# Patient Record
Sex: MALE | Race: WHITE | NOT HISPANIC OR LATINO | Employment: OTHER | URBAN - METROPOLITAN AREA
[De-identification: names, ages, dates, MRNs, and addresses within clinical notes are randomized per-mention and may not be internally consistent; named-entity substitution may affect disease eponyms.]

---

## 2017-08-25 ENCOUNTER — HOSPITAL ENCOUNTER (OUTPATIENT)
Dept: OTHER | Facility: HOSPITAL | Age: 67
Setting detail: SPECIMEN
Discharge: HOME | End: 2017-08-25
Attending: FAMILY MEDICINE | Admitting: FAMILY MEDICINE

## 2017-08-25 LAB
ANION GAP SERPL CALC-SCNC: 12.1 MMOL/L (ref 10–20)
BNP SERPL-MCNC: 72 PG/ML
BUN SERPL-MCNC: 13 MG/DL (ref 8–20)
BUN/CREAT SERPL: 13 (ref 6.2–20.3)
CALCIUM SERPL-MCNC: 8.6 MG/DL (ref 8.9–10.3)
CHLORIDE SERPL-SCNC: 100 MMOL/L (ref 101–111)
CONV CO2: 34 MMOL/L (ref 22–32)
CREAT UR-MCNC: 1 MG/DL (ref 0.7–1.2)
GLUCOSE SERPL-MCNC: 123 MG/DL (ref 65–99)
POTASSIUM SERPL-SCNC: 4.1 MMOL/L (ref 3.6–5.1)
SODIUM SERPL-SCNC: 142 MMOL/L (ref 136–144)

## 2017-09-11 ENCOUNTER — HOSPITAL ENCOUNTER (OUTPATIENT)
Dept: OTHER | Facility: HOSPITAL | Age: 67
Setting detail: SPECIMEN
Discharge: HOME | End: 2017-09-11
Attending: FAMILY MEDICINE | Admitting: FAMILY MEDICINE

## 2017-09-11 LAB
ALBUMIN SERPL-MCNC: 4 G/DL (ref 3.5–4.8)
ALBUMIN/GLOB SERPL: 1.5 {RATIO} (ref 1–1.7)
ALP SERPL-CCNC: 93 IU/L (ref 32–91)
ALT SERPL-CCNC: 7 IU/L (ref 17–63)
ANION GAP SERPL CALC-SCNC: 9.8 MMOL/L (ref 10–20)
AST SERPL-CCNC: 20 IU/L (ref 15–41)
BILIRUB SERPL-MCNC: 1.1 MG/DL (ref 0.3–1.2)
BUN SERPL-MCNC: 16 MG/DL (ref 8–20)
BUN/CREAT SERPL: 14.5 (ref 6.2–20.3)
CALCIUM SERPL-MCNC: 8.5 MG/DL (ref 8.9–10.3)
CHLORIDE SERPL-SCNC: 100 MMOL/L (ref 101–111)
CONV CO2: 33 MMOL/L (ref 22–32)
CONV TOTAL PROTEIN: 6.7 G/DL (ref 6.1–7.9)
CREAT UR-MCNC: 1.1 MG/DL (ref 0.7–1.2)
GLOBULIN UR ELPH-MCNC: 2.7 G/DL (ref 2.5–3.8)
GLUCOSE SERPL-MCNC: 185 MG/DL (ref 65–99)
POTASSIUM SERPL-SCNC: 3.8 MMOL/L (ref 3.6–5.1)
SODIUM SERPL-SCNC: 139 MMOL/L (ref 136–144)

## 2024-10-04 ENCOUNTER — HOSPITAL ENCOUNTER (INPATIENT)
Facility: HOSPITAL | Age: 74
LOS: 4 days | Discharge: NURSING, REHAB OR LONG-TERM CARE | End: 2024-10-08
Attending: STUDENT IN AN ORGANIZED HEALTH CARE EDUCATION/TRAINING PROGRAM | Admitting: STUDENT IN AN ORGANIZED HEALTH CARE EDUCATION/TRAINING PROGRAM
Payer: OTHER GOVERNMENT

## 2024-10-04 DIAGNOSIS — S72.002A CLOSED FRACTURE OF LEFT HIP, INITIAL ENCOUNTER: Primary | ICD-10-CM

## 2024-10-04 PROBLEM — I73.9 PVD (PERIPHERAL VASCULAR DISEASE): Status: ACTIVE | Noted: 2024-10-04

## 2024-10-04 PROBLEM — G20.A1 PARKINSON DISEASE: Status: ACTIVE | Noted: 2024-10-04

## 2024-10-04 PROBLEM — I25.10 CORONARY ATHEROSCLEROSIS OF NATIVE CORONARY ARTERY: Status: ACTIVE | Noted: 2024-10-04

## 2024-10-04 PROBLEM — E11.9 T2DM (TYPE 2 DIABETES MELLITUS): Status: ACTIVE | Noted: 2024-10-04

## 2024-10-04 PROBLEM — S72.009A HIP FRACTURE: Status: ACTIVE | Noted: 2024-10-04

## 2024-10-04 LAB — GLUCOSE BLDC GLUCOMTR-MCNC: 154 MG/DL (ref 70–130)

## 2024-10-04 PROCEDURE — 82948 REAGENT STRIP/BLOOD GLUCOSE: CPT

## 2024-10-04 RX ORDER — POLYETHYLENE GLYCOL 3350 17 G/17G
17 POWDER, FOR SOLUTION ORAL DAILY PRN
Status: DISCONTINUED | OUTPATIENT
Start: 2024-10-04 | End: 2024-10-08 | Stop reason: HOSPADM

## 2024-10-04 RX ORDER — SODIUM CHLORIDE 0.9 % (FLUSH) 0.9 %
10 SYRINGE (ML) INJECTION AS NEEDED
Status: DISCONTINUED | OUTPATIENT
Start: 2024-10-04 | End: 2024-10-08 | Stop reason: HOSPADM

## 2024-10-04 RX ORDER — AMOXICILLIN 250 MG
2 CAPSULE ORAL 2 TIMES DAILY PRN
Status: DISCONTINUED | OUTPATIENT
Start: 2024-10-04 | End: 2024-10-08 | Stop reason: HOSPADM

## 2024-10-04 RX ORDER — DEXTROSE MONOHYDRATE 25 G/50ML
25 INJECTION, SOLUTION INTRAVENOUS
Status: DISCONTINUED | OUTPATIENT
Start: 2024-10-04 | End: 2024-10-08 | Stop reason: HOSPADM

## 2024-10-04 RX ORDER — BISACODYL 10 MG
10 SUPPOSITORY, RECTAL RECTAL DAILY PRN
Status: DISCONTINUED | OUTPATIENT
Start: 2024-10-04 | End: 2024-10-08 | Stop reason: HOSPADM

## 2024-10-04 RX ORDER — INSULIN LISPRO 100 [IU]/ML
2-7 INJECTION, SOLUTION INTRAVENOUS; SUBCUTANEOUS
Status: DISCONTINUED | OUTPATIENT
Start: 2024-10-05 | End: 2024-10-08 | Stop reason: HOSPADM

## 2024-10-04 RX ORDER — HYDROCODONE BITARTRATE AND ACETAMINOPHEN 5; 325 MG/1; MG/1
1 TABLET ORAL EVERY 6 HOURS PRN
Status: DISCONTINUED | OUTPATIENT
Start: 2024-10-04 | End: 2024-10-05

## 2024-10-04 RX ORDER — ACETAMINOPHEN 500 MG
1000 TABLET ORAL 3 TIMES DAILY PRN
COMMUNITY

## 2024-10-04 RX ORDER — IBUPROFEN 600 MG/1
1 TABLET ORAL
Status: DISCONTINUED | OUTPATIENT
Start: 2024-10-04 | End: 2024-10-08 | Stop reason: HOSPADM

## 2024-10-04 RX ORDER — BISACODYL 5 MG/1
5 TABLET, DELAYED RELEASE ORAL DAILY PRN
Status: DISCONTINUED | OUTPATIENT
Start: 2024-10-04 | End: 2024-10-08 | Stop reason: HOSPADM

## 2024-10-04 RX ORDER — NICOTINE POLACRILEX 4 MG
15 LOZENGE BUCCAL
Status: DISCONTINUED | OUTPATIENT
Start: 2024-10-04 | End: 2024-10-08 | Stop reason: HOSPADM

## 2024-10-04 RX ORDER — SODIUM CHLORIDE 9 MG/ML
40 INJECTION, SOLUTION INTRAVENOUS AS NEEDED
Status: DISCONTINUED | OUTPATIENT
Start: 2024-10-04 | End: 2024-10-08 | Stop reason: HOSPADM

## 2024-10-04 RX ORDER — SODIUM CHLORIDE 0.9 % (FLUSH) 0.9 %
10 SYRINGE (ML) INJECTION EVERY 12 HOURS SCHEDULED
Status: DISCONTINUED | OUTPATIENT
Start: 2024-10-04 | End: 2024-10-08 | Stop reason: HOSPADM

## 2024-10-04 RX ADMIN — Medication 10 ML: at 22:29

## 2024-10-04 NOTE — LETTER
EMS Transport Request  For use at [facility], [place], [place], [place], and [place] only   Patient Name: Deonte Harrell : 1950   Weight:95.7 kg (211 lb) Pick-up Location: P785-1 BLS/ALS: BLS/ALS: BLS   Insurance: MEDICARE Auth End Date:    Pre-Cert #: D/C Summary complete:    Destination: Other [facility] in [place], [place].    Contact Precautions: None   Equipment (O2, Fluids, etc.): None   Arrive By Date/Time: 24 @ 1100 Stretcher/WC: Stretcher   CM Requesting: Jammie Scott RN Ext: [phone]   Notes/Medical Necessity: confusion, Parkinson's, s/p hip fracture repair     ______________________________________________________________________    *Only 2 patient bags OR 1 carry-on size bag are permitted.  Wheelchairs and walkers CANNOT transported with the patient. Acknowledge: Yes

## 2024-10-05 ENCOUNTER — APPOINTMENT (OUTPATIENT)
Dept: GENERAL RADIOLOGY | Facility: HOSPITAL | Age: 74
End: 2024-10-05
Payer: OTHER GOVERNMENT

## 2024-10-05 LAB
ALBUMIN SERPL-MCNC: 3.6 G/DL (ref 3.5–5.2)
ALBUMIN/GLOB SERPL: 1.4 G/DL
ALP SERPL-CCNC: 93 U/L (ref 39–117)
ALT SERPL W P-5'-P-CCNC: 11 U/L (ref 1–41)
ANION GAP SERPL CALCULATED.3IONS-SCNC: 10.7 MMOL/L (ref 5–15)
AST SERPL-CCNC: 12 U/L (ref 1–40)
BILIRUB SERPL-MCNC: 0.8 MG/DL (ref 0–1.2)
BUN SERPL-MCNC: 11 MG/DL (ref 8–23)
BUN/CREAT SERPL: 17.5 (ref 7–25)
CALCIUM SPEC-SCNC: 8.5 MG/DL (ref 8.6–10.5)
CHLORIDE SERPL-SCNC: 101 MMOL/L (ref 98–107)
CO2 SERPL-SCNC: 27.3 MMOL/L (ref 22–29)
CREAT SERPL-MCNC: 0.63 MG/DL (ref 0.76–1.27)
DEPRECATED RDW RBC AUTO: 43.9 FL (ref 37–54)
EGFRCR SERPLBLD CKD-EPI 2021: 101.1 ML/MIN/1.73
ERYTHROCYTE [DISTWIDTH] IN BLOOD BY AUTOMATED COUNT: 12 % (ref 12.3–15.4)
GLOBULIN UR ELPH-MCNC: 2.5 GM/DL
GLUCOSE BLDC GLUCOMTR-MCNC: 140 MG/DL (ref 70–130)
GLUCOSE BLDC GLUCOMTR-MCNC: 150 MG/DL (ref 70–130)
GLUCOSE BLDC GLUCOMTR-MCNC: 161 MG/DL (ref 70–130)
GLUCOSE BLDC GLUCOMTR-MCNC: 184 MG/DL (ref 70–130)
GLUCOSE SERPL-MCNC: 160 MG/DL (ref 65–99)
HBA1C MFR BLD: 6.7 % (ref 4.8–5.6)
HCT VFR BLD AUTO: 35.6 % (ref 37.5–51)
HGB BLD-MCNC: 11.9 G/DL (ref 13–17.7)
MAGNESIUM SERPL-MCNC: 1.7 MG/DL (ref 1.6–2.4)
MCH RBC QN AUTO: 33.1 PG (ref 26.6–33)
MCHC RBC AUTO-ENTMCNC: 33.4 G/DL (ref 31.5–35.7)
MCV RBC AUTO: 99.2 FL (ref 79–97)
PHOSPHATE SERPL-MCNC: 2.9 MG/DL (ref 2.5–4.5)
PLATELET # BLD AUTO: 178 10*3/MM3 (ref 140–450)
PMV BLD AUTO: 10.7 FL (ref 6–12)
POTASSIUM SERPL-SCNC: 3.8 MMOL/L (ref 3.5–5.2)
PROT SERPL-MCNC: 6.1 G/DL (ref 6–8.5)
RBC # BLD AUTO: 3.59 10*6/MM3 (ref 4.14–5.8)
SODIUM SERPL-SCNC: 139 MMOL/L (ref 136–145)
WBC NRBC COR # BLD AUTO: 8.34 10*3/MM3 (ref 3.4–10.8)

## 2024-10-05 PROCEDURE — 85027 COMPLETE CBC AUTOMATED: CPT | Performed by: STUDENT IN AN ORGANIZED HEALTH CARE EDUCATION/TRAINING PROGRAM

## 2024-10-05 PROCEDURE — 94799 UNLISTED PULMONARY SVC/PX: CPT

## 2024-10-05 PROCEDURE — 73552 X-RAY EXAM OF FEMUR 2/>: CPT

## 2024-10-05 PROCEDURE — 82948 REAGENT STRIP/BLOOD GLUCOSE: CPT

## 2024-10-05 PROCEDURE — 94640 AIRWAY INHALATION TREATMENT: CPT

## 2024-10-05 PROCEDURE — 63710000001 INSULIN LISPRO (HUMAN) PER 5 UNITS: Performed by: STUDENT IN AN ORGANIZED HEALTH CARE EDUCATION/TRAINING PROGRAM

## 2024-10-05 PROCEDURE — 80053 COMPREHEN METABOLIC PANEL: CPT | Performed by: STUDENT IN AN ORGANIZED HEALTH CARE EDUCATION/TRAINING PROGRAM

## 2024-10-05 PROCEDURE — 83735 ASSAY OF MAGNESIUM: CPT | Performed by: STUDENT IN AN ORGANIZED HEALTH CARE EDUCATION/TRAINING PROGRAM

## 2024-10-05 PROCEDURE — 72170 X-RAY EXAM OF PELVIS: CPT

## 2024-10-05 PROCEDURE — 83036 HEMOGLOBIN GLYCOSYLATED A1C: CPT | Performed by: STUDENT IN AN ORGANIZED HEALTH CARE EDUCATION/TRAINING PROGRAM

## 2024-10-05 PROCEDURE — 84100 ASSAY OF PHOSPHORUS: CPT | Performed by: STUDENT IN AN ORGANIZED HEALTH CARE EDUCATION/TRAINING PROGRAM

## 2024-10-05 PROCEDURE — 93005 ELECTROCARDIOGRAM TRACING: CPT | Performed by: INTERNAL MEDICINE

## 2024-10-05 RX ORDER — ASPIRIN 81 MG/1
81 TABLET ORAL DAILY
COMMUNITY

## 2024-10-05 RX ORDER — PROMETHAZINE HYDROCHLORIDE 25 MG/1
12.5 TABLET ORAL EVERY 6 HOURS PRN
COMMUNITY
Start: 2014-10-22

## 2024-10-05 RX ORDER — IPRATROPIUM BROMIDE AND ALBUTEROL SULFATE 2.5; .5 MG/3ML; MG/3ML
3 SOLUTION RESPIRATORY (INHALATION)
COMMUNITY
Start: 2024-08-19

## 2024-10-05 RX ORDER — FUROSEMIDE 40 MG
1 TABLET ORAL DAILY
COMMUNITY

## 2024-10-05 RX ORDER — BISACODYL 5 MG/1
5 TABLET, DELAYED RELEASE ORAL DAILY PRN
COMMUNITY

## 2024-10-05 RX ORDER — METOPROLOL TARTRATE 50 MG
50 TABLET ORAL 2 TIMES DAILY
Status: DISCONTINUED | OUTPATIENT
Start: 2024-10-05 | End: 2024-10-08 | Stop reason: HOSPADM

## 2024-10-05 RX ORDER — CHOLECALCIFEROL (VITAMIN D3) 25 MCG
1000 TABLET ORAL DAILY
Status: DISCONTINUED | OUTPATIENT
Start: 2024-10-05 | End: 2024-10-08 | Stop reason: HOSPADM

## 2024-10-05 RX ORDER — POLYETHYLENE GLYCOL 3350 17 G/17G
17 POWDER, FOR SOLUTION ORAL 2 TIMES DAILY PRN
COMMUNITY
End: 2024-10-08 | Stop reason: HOSPADM

## 2024-10-05 RX ORDER — MORPHINE SULFATE 4 MG/ML
2 INJECTION, SOLUTION INTRAMUSCULAR; INTRAVENOUS
Status: DISCONTINUED | OUTPATIENT
Start: 2024-10-05 | End: 2024-10-08 | Stop reason: HOSPADM

## 2024-10-05 RX ORDER — PANTOPRAZOLE SODIUM 40 MG/1
40 TABLET, DELAYED RELEASE ORAL
Status: DISCONTINUED | OUTPATIENT
Start: 2024-10-06 | End: 2024-10-07 | Stop reason: ALTCHOICE

## 2024-10-05 RX ORDER — POLYETHYLENE GLYCOL 3350 17 G/17G
17 POWDER, FOR SOLUTION ORAL 2 TIMES DAILY
Status: DISCONTINUED | OUTPATIENT
Start: 2024-10-05 | End: 2024-10-08 | Stop reason: HOSPADM

## 2024-10-05 RX ORDER — PROMETHAZINE HYDROCHLORIDE 12.5 MG/1
12.5 TABLET ORAL EVERY 6 HOURS PRN
Status: DISCONTINUED | OUTPATIENT
Start: 2024-10-05 | End: 2024-10-08 | Stop reason: HOSPADM

## 2024-10-05 RX ORDER — LISINOPRIL 5 MG/1
5 TABLET ORAL DAILY
Status: DISCONTINUED | OUTPATIENT
Start: 2024-10-05 | End: 2024-10-08 | Stop reason: HOSPADM

## 2024-10-05 RX ORDER — POTASSIUM CHLORIDE 750 MG/1
10 TABLET, FILM COATED, EXTENDED RELEASE ORAL DAILY
Status: DISCONTINUED | OUTPATIENT
Start: 2024-10-05 | End: 2024-10-08 | Stop reason: HOSPADM

## 2024-10-05 RX ORDER — CARBIDOPA AND LEVODOPA 25; 100 MG/1; MG/1
1 TABLET ORAL 3 TIMES DAILY
Status: DISCONTINUED | OUTPATIENT
Start: 2024-10-05 | End: 2024-10-08 | Stop reason: HOSPADM

## 2024-10-05 RX ORDER — QUETIAPINE FUMARATE 25 MG/1
25 TABLET, FILM COATED ORAL NIGHTLY
COMMUNITY
Start: 2024-09-30

## 2024-10-05 RX ORDER — LISINOPRIL 5 MG/1
5 TABLET ORAL DAILY
COMMUNITY
Start: 2014-10-22

## 2024-10-05 RX ORDER — AMANTADINE HYDROCHLORIDE 100 MG/1
100 CAPSULE, GELATIN COATED ORAL EVERY 12 HOURS SCHEDULED
Status: DISCONTINUED | OUTPATIENT
Start: 2024-10-05 | End: 2024-10-08 | Stop reason: HOSPADM

## 2024-10-05 RX ORDER — GLIPIZIDE 5 MG/1
5 TABLET ORAL
COMMUNITY
Start: 2014-10-22

## 2024-10-05 RX ORDER — AMOXICILLIN 250 MG
1 CAPSULE ORAL 2 TIMES DAILY
Status: DISCONTINUED | OUTPATIENT
Start: 2024-10-05 | End: 2024-10-08 | Stop reason: HOSPADM

## 2024-10-05 RX ORDER — IPRATROPIUM BROMIDE AND ALBUTEROL SULFATE 2.5; .5 MG/3ML; MG/3ML
3 SOLUTION RESPIRATORY (INHALATION)
Status: DISCONTINUED | OUTPATIENT
Start: 2024-10-05 | End: 2024-10-08 | Stop reason: HOSPADM

## 2024-10-05 RX ORDER — AMANTADINE HYDROCHLORIDE 100 MG/1
100 TABLET ORAL 2 TIMES DAILY
COMMUNITY
Start: 2014-10-22

## 2024-10-05 RX ORDER — POTASSIUM CHLORIDE 750 MG/1
10 CAPSULE, EXTENDED RELEASE ORAL DAILY
COMMUNITY
Start: 2024-10-01

## 2024-10-05 RX ORDER — QUETIAPINE FUMARATE 25 MG/1
25 TABLET, FILM COATED ORAL NIGHTLY
Status: DISCONTINUED | OUTPATIENT
Start: 2024-10-05 | End: 2024-10-08 | Stop reason: HOSPADM

## 2024-10-05 RX ORDER — ATORVASTATIN CALCIUM 80 MG/1
80 TABLET, FILM COATED ORAL DAILY
COMMUNITY

## 2024-10-05 RX ORDER — ISOSORBIDE MONONITRATE 30 MG/1
30 TABLET, EXTENDED RELEASE ORAL DAILY
COMMUNITY
Start: 2014-10-22

## 2024-10-05 RX ORDER — METOPROLOL TARTRATE 50 MG
50 TABLET ORAL 2 TIMES DAILY
COMMUNITY
Start: 2014-10-22

## 2024-10-05 RX ORDER — HYDROCODONE BITARTRATE AND ACETAMINOPHEN 7.5; 325 MG/1; MG/1
1 TABLET ORAL EVERY 4 HOURS PRN
Status: DISCONTINUED | OUTPATIENT
Start: 2024-10-05 | End: 2024-10-08 | Stop reason: HOSPADM

## 2024-10-05 RX ORDER — LOPERAMIDE HYDROCHLORIDE 2 MG/1
2 TABLET ORAL 4 TIMES DAILY PRN
COMMUNITY

## 2024-10-05 RX ORDER — CARBIDOPA AND LEVODOPA 25; 100 MG/1; MG/1
1 TABLET ORAL 3 TIMES DAILY
COMMUNITY
Start: 2014-10-22

## 2024-10-05 RX ORDER — ROPINIROLE 2 MG/1
2 TABLET, FILM COATED ORAL NIGHTLY
COMMUNITY
Start: 2014-10-22

## 2024-10-05 RX ORDER — OMEGA-3S/DHA/EPA/FISH OIL/D3 300MG-1000
CAPSULE ORAL DAILY
COMMUNITY

## 2024-10-05 RX ORDER — ROPINIROLE 2 MG/1
2 TABLET, FILM COATED ORAL NIGHTLY
Status: DISCONTINUED | OUTPATIENT
Start: 2024-10-05 | End: 2024-10-08 | Stop reason: HOSPADM

## 2024-10-05 RX ORDER — AMOXICILLIN 250 MG
1 CAPSULE ORAL 2 TIMES DAILY
COMMUNITY

## 2024-10-05 RX ORDER — POTASSIUM CHLORIDE 750 MG/1
10 TABLET, EXTENDED RELEASE ORAL DAILY
COMMUNITY

## 2024-10-05 RX ADMIN — HYDROCODONE BITARTRATE AND ACETAMINOPHEN 1 TABLET: 7.5; 325 TABLET ORAL at 20:52

## 2024-10-05 RX ADMIN — POLYETHYLENE GLYCOL 3350 17 G: 17 POWDER, FOR SOLUTION ORAL at 12:56

## 2024-10-05 RX ADMIN — Medication 5 MG: at 20:52

## 2024-10-05 RX ADMIN — INSULIN LISPRO 2 UNITS: 100 INJECTION, SOLUTION INTRAVENOUS; SUBCUTANEOUS at 11:50

## 2024-10-05 RX ADMIN — INSULIN LISPRO 2 UNITS: 100 INJECTION, SOLUTION INTRAVENOUS; SUBCUTANEOUS at 16:49

## 2024-10-05 RX ADMIN — Medication 10 ML: at 20:52

## 2024-10-05 RX ADMIN — IPRATROPIUM BROMIDE AND ALBUTEROL SULFATE 3 ML: .5; 3 SOLUTION RESPIRATORY (INHALATION) at 20:39

## 2024-10-05 RX ADMIN — METOPROLOL TARTRATE 50 MG: 50 TABLET, FILM COATED ORAL at 20:54

## 2024-10-05 RX ADMIN — METOPROLOL TARTRATE 50 MG: 50 TABLET, FILM COATED ORAL at 12:55

## 2024-10-05 RX ADMIN — LISINOPRIL 5 MG: 5 TABLET ORAL at 12:55

## 2024-10-05 RX ADMIN — IPRATROPIUM BROMIDE AND ALBUTEROL SULFATE 3 ML: .5; 3 SOLUTION RESPIRATORY (INHALATION) at 15:21

## 2024-10-05 RX ADMIN — AMANTADINE HYDROCHLORIDE 100 MG: 100 CAPSULE ORAL at 12:55

## 2024-10-05 RX ADMIN — HYDROCODONE BITARTRATE AND ACETAMINOPHEN 1 TABLET: 5; 325 TABLET ORAL at 11:50

## 2024-10-05 RX ADMIN — METFORMIN HYDROCHLORIDE 1000 MG: 1000 TABLET, FILM COATED ORAL at 16:49

## 2024-10-05 RX ADMIN — ROPINIROLE 2 MG: 2 TABLET, FILM COATED ORAL at 20:52

## 2024-10-05 RX ADMIN — INSULIN LISPRO 2 UNITS: 100 INJECTION, SOLUTION INTRAVENOUS; SUBCUTANEOUS at 22:34

## 2024-10-05 RX ADMIN — Medication 1000 UNITS: at 12:55

## 2024-10-05 RX ADMIN — HYDROCODONE BITARTRATE AND ACETAMINOPHEN 1 TABLET: 7.5; 325 TABLET ORAL at 16:26

## 2024-10-05 RX ADMIN — Medication 10 ML: at 08:04

## 2024-10-05 RX ADMIN — CARBIDOPA AND LEVODOPA 1 TABLET: 25; 100 TABLET ORAL at 16:26

## 2024-10-05 RX ADMIN — SENNOSIDES AND DOCUSATE SODIUM 2 TABLET: 50; 8.6 TABLET ORAL at 12:55

## 2024-10-05 RX ADMIN — AMANTADINE HYDROCHLORIDE 100 MG: 100 CAPSULE ORAL at 20:52

## 2024-10-05 RX ADMIN — CARBIDOPA AND LEVODOPA 1 TABLET: 25; 100 TABLET ORAL at 20:51

## 2024-10-05 RX ADMIN — POTASSIUM CHLORIDE 10 MEQ: 750 TABLET, EXTENDED RELEASE ORAL at 12:55

## 2024-10-05 RX ADMIN — QUETIAPINE FUMARATE 25 MG: 25 TABLET ORAL at 20:52

## 2024-10-05 RX ADMIN — HYDROCODONE BITARTRATE AND ACETAMINOPHEN 1 TABLET: 5; 325 TABLET ORAL at 05:21

## 2024-10-05 NOTE — H&P
"    Patient Name:  Deonte Harrell  YOB: 1950  MRN:  [id number]  Admit Date:  10/4/2024  Patient Care Team:  Provider, No Known as PCP - General  Provider, No Known as PCP - Family Medicine      Subjective   History Present Illness     No chief complaint on file.          Personal History     No past medical history on file.  No past surgical history on file.  No family history on file.     No current facility-administered medications on file prior to encounter.     No current outpatient medications on file prior to encounter.     Not on File    Objective    Objective     Vital Signs  Temp:  [98.6 °F (37 °C)] 98.6 °F (37 °C)  Heart Rate:  [70] 70  Resp:  [18] 18  BP: (137)/(64) 137/64     on   ;      There is no height or weight on file to calculate BMI.    Physical Exam    Results Review:  I reviewed the patient's new clinical results.  I reviewed the patient's new imaging results and agree with the interpretation.  I reviewed the patient's other test results and agree with the interpretation  I personally viewed and interpreted the patient's EKG/Telemetry data  Discussed with ED provider.    Lab Results (last 24 hours)       ** No results found for the last 24 hours. **            No results found for this or any previous visit.    Imaging Results (Last 24 Hours)       ** No results found for the last 24 hours. **                No orders to display              Assessment/Plan     Active Hospital Problems    Diagnosis  POA    **Hip fracture [S72.009A]  Yes      Resolved Hospital Problems   No resolved problems to display.         ***  I discussed the patient's findings and my recommendations with {discussed with:45791::\"patient\"}.    VTE Prophylaxis - {DVTpx:27370::\"SCDs\"}.  Code Status - {code status:27838::\"Full code\"}.       Adelso Kohler MD  [facility]  10/04/24  20:27 EDT    "

## 2024-10-05 NOTE — H&P
"    Patient Name:  Deonte Harrell  YOB: 1950  MRN:  [id number]  Admit Date:  10/4/2024  Patient Care Team:  Provider, No Known as PCP - General  Provider, No Known as PCP - Family Medicine      Subjective   History Present Illness     No chief complaint on file.  This is a 74-year-old male with a past medical history of Parkinson's disease, coronary artery disease, type 2 diabetes with peripheral neuropathy, BPH, peripheral vascular disease, type 2 diabetes who presents the hospital after experiencing a fracture of the left hip.  Apparently he was not using his walker at the nursing home and he fell.  He was then brought to the [facility] where imaging revealed a left hip fracture.  He was then brought to [facility] for further orthopedic management.    From review of the transfer records, the only medication that he appears to be on currently is Tylenol.    It should be noted that the patient has a very dysarthric speech.  Initially had a very hard time understanding him, and when I explained to him, he responded with \"everyone does\".  He was able to follow all commands, and does not appear to have any other he was neurodeficit otherwise.      Personal History     No past medical history on file.  No past surgical history on file.  No family history on file.     No current facility-administered medications on file prior to encounter.     No current outpatient medications on file prior to encounter.     Allergies   Allergen Reactions    [facility]on[facility]epam [facility] ([facility] Comments[facility]     [facility]    Entacapone Other (See Comments)     [facility]    Isosorbide Mononitrate [Isosorbide Nitrate] Other (See Comments)     [facility]    Plecanatide Other (See Comments)     [facility]       Objective    Objective     Vital Signs  Temp:  [98.6 °F (37 °C)] 98.6 °F (37 °C)  Heart Rate:  [70-75] 75  Resp:  [16-18] 16  BP: (137-145)/(64-69) 145/69  SpO2:  [95 %] 95 %  on   ;   Device (Oxygen " Therapy): room air  There is no height or weight on file to calculate BMI.    Physical Exam  Constitutional:       General: He is not in acute distress.     Appearance: He is ill-appearing.   HENT:      Head: Normocephalic and atraumatic.   Cardiovascular:      Rate and Rhythm: Normal rate and regular rhythm.   Pulmonary:      Effort: Pulmonary effort is normal. No respiratory distress.   Abdominal:      General: There is no distension.      Palpations: Abdomen is soft.      Tenderness: There is no abdominal tenderness.   Skin:     General: Skin is warm and dry.   Neurological:      Mental Status: He is alert and oriented to person, place, and time.      Cranial Nerves: No cranial nerve deficit.         Results Review:  I reviewed the patient's new clinical results.  I reviewed the patient's new imaging results and agree with the interpretation.  I reviewed the patient's other test results and agree with the interpretation  I personally viewed and interpreted the patient's EKG/Telemetry data  Discussed with ED provider.    Lab Results (last 24 hours)       Procedure Component Value Units Date/Time    POC Glucose Once [[id number]]  (Abnormal) Collected: 10/04/24 2113    Specimen: Blood Updated: 10/04/24 2114     Glucose 154 mg/dL             No results found for this or any previous visit.    Imaging Results (Last 24 Hours)       ** No results found for the last 24 hours. **                No orders to display              Assessment/Plan     Active Hospital Problems    Diagnosis  POA    **Hip fracture [S72.009A]  Yes      Resolved Hospital Problems   No resolved problems to display.     Left hip fracture  The chemical fall  Due to not using his walker at the nursing home  Orthopedic surgery has been consulted    Dysarthria  Parkinson's disease  I suspect that this is a complication of Parkinson's disorder  Including consuls speech therapy for swallow evaluation.  Once again, from review of records it does not appear  as though he is taking any medications however I could be mistaken in this.    T2DM  Check A1c  Start SSI      I discussed the patient's findings and my recommendations with patient and ED provider.    VTE Prophylaxis - SCDs.  Code Status - Full code.       Adelso Kohler MD  [facility]  10/04/24  23:08 EDT

## 2024-10-05 NOTE — PROGRESS NOTES
Name: Deonte Harrell ADMIT: 10/4/2024   : 1950  PCP: Provider, No Known    MRN: [id number] LOS: 1 days   AGE/SEX: 74 y.o. male  ROOM: [facility]   Subjective   No chief complaint on file.  Cc- hip pain    Pain fair  Worse with movement  Partial improvement with meds  To have surgery tomorrow    ROS  No f/c  No n/v  No cp/palp  No soa/cough    Objective   Vital Signs  Temp:  [97.5 °F (36.4 °C)-98.6 °F (37 °C)] 97.8 °F (36.6 °C)  Heart Rate:  [72-78] 74  Resp:  [16] 16  BP: (105-152)/(54-78) 105/54  SpO2:  [95 %-96 %] 96 %  on   ;   Device (Oxygen Therapy): room air  There is no height or weight on file to calculate BMI.    Physical Exam  HENT:      Head: Normocephalic and atraumatic.   Eyes:      General: No scleral icterus.  Cardiovascular:      Rate and Rhythm: Normal rate and regular rhythm.      Heart sounds: Normal heart sounds.   Pulmonary:      Effort: Pulmonary effort is normal. No respiratory distress.      Breath sounds: Normal breath sounds.   Abdominal:      General: There is no distension.      Palpations: Abdomen is soft.      Tenderness: There is no abdominal tenderness.   Musculoskeletal:         General: Tenderness, deformity and signs of injury present.      Cervical back: Neck supple.   Neurological:      Mental Status: He is alert.   Psychiatric:         Behavior: Behavior normal.     Elderly  Chronically ill  Poor memory    Results Review:       I reviewed the patient's new clinical results.  Results from last 7 days   Lab Units 10/05/24  0416   WBC 10*3/mm3 8.34   HEMOGLOBIN g/dL 11.9*   PLATELETS 10*3/mm3 178     Results from last 7 days   Lab Units 10/05/24  0416   SODIUM mmol/L 139   POTASSIUM mmol/L 3.8   CHLORIDE mmol/L 101   CO2 mmol/L 27.3   BUN mg/dL 11   CREATININE mg/dL 0.63*   GLUCOSE mg/dL 160*   CrCl cannot be calculated (Unknown ideal weight.).  Results from last 7 days   Lab Units 10/05/24  0416   ALBUMIN g/dL 3.6   BILIRUBIN mg/dL 0.8   ALK PHOS U/L 93   AST (SGOT) U/L  "12   ALT (SGPT) U/L 11     Results from last 7 days   Lab Units 10/05/24  0416   CALCIUM mg/dL 8.5*   ALBUMIN g/dL 3.6   MAGNESIUM mg/dL 1.7   PHOSPHORUS mg/dL 2.9         Coag     HbA1C   Lab Results   Component Value Date    HGBA1C 6.70 (H) 10/05/2024     Infection     Radiology(recent) XR Femur 2 View Left    Result Date: 10/5/2024   Left intertrochanteric fracture.    This report was finalized on 10/5/2024 7:00 AM by Dr. Jean Paul Lanier M.D on Workstation: [employer]      XR Pelvis 1 or 2 View    Result Date: 10/5/2024   Left intertrochanteric fracture.    This report was finalized on 10/5/2024 7:00 AM by Dr. Jean Paul Lanier M.D on Workstation: [employer]     No results found for: \"TROPONINT\", \"TROPONINI\", \"BNP\"  No components found for: \"TSH;2\"    amantadine, 100 mg, Oral, Q12H  carbidopa-levodopa, 1 tablet, Oral, TID  cholecalciferol, 1,000 Units, Oral, Daily  insulin lispro, 2-7 Units, Subcutaneous, 4x Daily AC & at Bedtime  ipratropium-albuterol, 3 mL, Nebulization, 4x Daily - RT  lisinopril, 5 mg, Oral, Daily  melatonin, 5 mg, Oral, Nightly  metFORMIN, 1,000 mg, Oral, BID With Meals  metoprolol tartrate, 50 mg, Oral, BID  [START ON 10/6/2024] pantoprazole, 40 mg, Oral, Q AM  polyethylene glycol, 17 g, Oral, BID  potassium chloride, 10 mEq, Oral, Daily  QUEtiapine, 25 mg, Oral, Nightly  rOPINIRole, 2 mg, Oral, Nightly  sennosides-docusate, 1 tablet, Oral, BID  sodium chloride, 10 mL, Intravenous, Q12H       Diet: Regular/House; Fluid Consistency: Thin (IDDSI 0)  NPO Diet NPO Type: Sips with Meds      Assessment & Plan      Active Hospital Problems    Diagnosis  POA    **Hip fracture [S72.009A]  Yes    Coronary atherosclerosis of native coronary artery [I25.10]  Unknown    PVD (peripheral vascular disease) [I73.9]  Unknown    Parkinson disease [G20.A1]  Unknown    T2DM (type 2 diabetes mellitus) [E11.9]  Unknown      Resolved Hospital Problems   No resolved problems to display.       Left hip " fracture  Orthopedic surgery has been consulted and plans for OR tomorrow  Increase prn agents for pain  No chest pain. Will obtain pre-op baseline EKG but no indications for additional cardiac workup prior to urgent surgery.      Dysarthria  Parkinson's disease  Likely a complication of Parkinson's disorder  Resume home parkinsons medications     T2DM  Start SSI  Resume metformin           VTE Prophylaxis - SCDs.  Code Status - Full code.         DW RN  DW family     Dispo- likely SNF on Tuesday  Gucci Stafford MD  [facility]  10/05/24  14:37 EDT

## 2024-10-05 NOTE — CONSULTS
ORTHOPEDIC SURGERY CONSULT      Patient: Deonte Harrell  Date of Admission: 10/4/2024  7:21 PM  YOB: 1950  Medical Record Number: [id number]  Attending Physician: Gucci Stafford MD  Consulting Physician: Gene Norris MD    CHIEF COMPLAINT: Left hip pain    HISTORY OF PRESENT ILLINESS: Patient is a 74 y.o. year old male presents to [facility] with above complaints.  I was consulted for further evaluation and treatment.  He fell yesterday and had immediate pain in the left hip.  [facility] found to have an intertrochanteric femur fracture.  He was transferred here for care.  I was called about the consult this morning.  He denies any other complaints.    ALLERGIES:   Allergies   Allergen Reactions    Clonazepam Other (See Comments)     [facility]    Entacapone Other (See Comments)     [facility]    Isosorbide Mononitrate [Isosorbide Nitrate] Other (See Comments)     From [facility]    Plecanatide Other (See Comments)     From [facility]       HOME MEDICATIONS:  Medications Prior to Admission   Medication Sig Dispense Refill Last Dose    acetaminophen (TYLENOL) 500 MG tablet Take 2 tablets by mouth 3 (Three) Times a Day As Needed for Mild Pain. From [facility] forms          CURRENT MEDICATIONS:  Scheduled Meds:insulin lispro, 2-7 Units, Subcutaneous, 4x Daily AC & at Bedtime  sodium chloride, 10 mL, Intravenous, Q12H      Continuous Infusions:   PRN Meds:.  senna-docusate sodium **AND** polyethylene glycol **AND** bisacodyl **AND** bisacodyl    Calcium Replacement - Follow Nurse / BPA Driven Protocol    dextrose    dextrose    glucagon (human recombinant)    HYDROcodone-acetaminophen    Magnesium Standard Dose Replacement - Follow Nurse / BPA Driven Protocol    Phosphorus Replacement - Follow Nurse / BPA Driven Protocol    Potassium Replacement - Follow Nurse / BPA Driven Protocol    sodium chloride    sodium chloride    No past medical history on  file.  No past surgical history on file.  Social History     Occupational History    Not on file   Tobacco Use    Smoking status: Not on file    Smokeless tobacco: Not on file   Substance and Sexual Activity    Alcohol use: Not on file    Drug use: Not on file    Sexual activity: Not on file      Social History     Social History Narrative    Not on file     No family history on file.    REVIEW OF SYSTEMS:   12 point ROS is negative except as above     PHYSICAL EXAM:   Vitals:  Vitals:    10/04/24 1924 10/04/24 2037 10/05/24 0127 10/05/24 0546   BP:  145/69 125/62 134/78   BP Location:  Left arm Left arm Right arm   Patient Position:  Lying Lying Lying   Pulse:  75 72 78   Resp:  16 16 16   Temp:  98.6 °F (37 °C) 98.2 °F (36.8 °C) 97.5 °F (36.4 °C)   TempSrc:  Oral Oral Oral   SpO2:  95% 95% 95%   Weight: 97.2 kg (214 lb 4.6 oz)           GENERAL: no distress   HEENT: normocephalic   CV: No audible murmur   Resp: no audible wheezes   RUE: No deformity.  No tenderness   LUE: No deformity.  No tenderness   RLE: No deformity.  No tenderness   LLE: Tenderness to the hip.  Pain with range of motion.  Brisk capillary refill.  Palpable pulses.      DIAGNOSTIC TEST:  Admission on 10/04/2024   Component Date Value Ref Range Status    Glucose 10/04/2024 154 (H)  70 - 130 mg/dL Final    Glucose 10/05/2024 160 (H)  65 - 99 mg/dL Final    BUN 10/05/2024 11  8 - 23 mg/dL Final    Creatinine 10/05/2024 0.63 (L)  0.76 - 1.27 mg/dL Final    Sodium 10/05/2024 139  136 - 145 mmol/L Final    Potassium 10/05/2024 3.8  3.5 - 5.2 mmol/L Final    Chloride 10/05/2024 101  98 - 107 mmol/L Final    CO2 10/05/2024 27.3  22.0 - 29.0 mmol/L Final    Calcium 10/05/2024 8.5 (L)  8.6 - 10.5 mg/dL Final    Total Protein 10/05/2024 6.1  6.0 - 8.5 g/dL Final    Albumin 10/05/2024 3.6  3.5 - 5.2 g/dL Final    ALT (SGPT) 10/05/2024 11  1 - 41 U/L Final    AST (SGOT) 10/05/2024 12  1 - 40 U/L Final    Alkaline Phosphatase 10/05/2024 93  39 - 117 U/L  Final    Total Bilirubin 10/05/2024 0.8  0.0 - 1.2 mg/dL Final    Globulin 10/05/2024 2.5  gm/dL Final    A/G Ratio 10/05/2024 1.4  g/dL Final    BUN/Creatinine Ratio 10/05/2024 17.5  7.0 - 25.0 Final    Anion Gap 10/05/2024 10.7  5.0 - 15.0 mmol/L Final    eGFR 10/05/2024 101.1  >60.0 mL/min/1.73 Final    Magnesium 10/05/2024 1.7  1.6 - 2.4 mg/dL Final    Phosphorus 10/05/2024 2.9  2.5 - 4.5 mg/dL Final    WBC 10/05/2024 8.34  3.40 - 10.80 10*3/mm3 Final    RBC 10/05/2024 3.59 (L)  4.14 - 5.80 10*6/mm3 Final    Hemoglobin 10/05/2024 11.9 (L)  13.0 - 17.7 g/dL Final    Hematocrit 10/05/2024 35.6 (L)  37.5 - 51.0 % Final    MCV 10/05/2024 99.2 (H)  79.0 - 97.0 fL Final    MCH 10/05/2024 33.1 (H)  26.6 - 33.0 pg Final    MCHC 10/05/2024 33.4  31.5 - 35.7 g/dL Final    RDW 10/05/2024 12.0 (L)  12.3 - 15.4 % Final    RDW-SD 10/05/2024 43.9  37.0 - 54.0 fl Final    MPV 10/05/2024 10.7  6.0 - 12.0 fL Final    Platelets 10/05/2024 178  140 - 450 10*3/mm3 Final    Hemoglobin A1C 10/05/2024 6.70 (H)  4.80 - 5.60 % Final    Glucose 10/05/2024 140 (H)  70 - 130 mg/dL Final       No results found.    AP lateral views of the left femur shows mildly displaced left intertrochanteric femur fracture.  No other fractures, dislocations, subluxations.  ASSESSMENT:  Mr. Harrell is a 74-year-old male with left intertrochanteric femur fracture    Hip fracture    Coronary atherosclerosis of native coronary artery    PVD (peripheral vascular disease)    Parkinson disease    T2DM (type 2 diabetes mellitus)      PLAN:    I discussed with the patient the risks and benefits of surgery.  We discussed that with intertrochanteric femur fractures he would need an intramedullary fixation of his left hip.  We discussed the risks and benefits of this including but not limited to bleeding, infection, nerve injury, vascular injury, pain, nonunion, malunion, pneumonia, bladder infection, blood clots, heart attack, stroke, death.  He understands the  risks and wants to go forward with surgery.  He was not made n.p.o. overnight.  He has had applesauce this morning.  We have him on the schedule for tomorrow morning.    The above diagnosis and treatment plan was discussed with the patient.  They were educated in treatment options for their condition.   They were given the opportunity to ask questions and were answered to their satisfaction.  They agreed to proceed with the above treatment plan.        Gene Norris MD  Date: 10/5/2024

## 2024-10-05 NOTE — PLAN OF CARE
Goal Outcome Evaluation:  Plan of Care Reviewed With: patient        Progress: no change    [facility] Pt is awake, A/Ox2-3, garbled speech. No s/s distress. Came in w/ Izaguirre [facility] d/t urinary retention. Also came in g-18 IV site to his left forearm, saline locked. Pt originally from [facility], c/o left hip pain s/p fall at NH. [facility] shows acute mildly displaced and mildly comminuted left intertrochanteric fracture. Per report from daysadam RN, pt [facility] on 10[facility]/24. Spouse Samantha present for a brief period, pt takes pills crushed and uses walker on/off at the facility. Per spouse, pt has a brain stimulator for Parkinson's and cannot do MRI. Offered drinks and food, pt declined. Needs attended.     Pt grimacing, c/o pain 8/10. PRN Norco, crushed w/ apple sauce given this morning.

## 2024-10-05 NOTE — PLAN OF CARE
Goal Outcome Evaluation:  Plan of Care Reviewed With: patient        Progress: no change  Outcome Evaluation: vss. ra. a&ox3-4 forgerful - hx parkinsons - speech garbled (baseline). reg diet. meds crushed. pain tolerated with po prn meds. fc intact for retention. q2turn. plan for surgery tomorrow. educated on bp monitoring. dc plan tbd at this time. con't plan of care.

## 2024-10-06 ENCOUNTER — ANESTHESIA EVENT (OUTPATIENT)
Dept: PERIOP | Facility: HOSPITAL | Age: 74
End: 2024-10-06
Payer: OTHER GOVERNMENT

## 2024-10-06 ENCOUNTER — ANESTHESIA (OUTPATIENT)
Dept: PERIOP | Facility: HOSPITAL | Age: 74
End: 2024-10-06
Payer: OTHER GOVERNMENT

## 2024-10-06 ENCOUNTER — APPOINTMENT (OUTPATIENT)
Dept: GENERAL RADIOLOGY | Facility: HOSPITAL | Age: 74
End: 2024-10-06
Payer: OTHER GOVERNMENT

## 2024-10-06 LAB
ANION GAP SERPL CALCULATED.3IONS-SCNC: 5.6 MMOL/L (ref 5–15)
BUN SERPL-MCNC: 14 MG/DL (ref 8–23)
BUN/CREAT SERPL: 15.4 (ref 7–25)
CALCIUM SPEC-SCNC: 8.9 MG/DL (ref 8.6–10.5)
CHLORIDE SERPL-SCNC: 106 MMOL/L (ref 98–107)
CO2 SERPL-SCNC: 31.4 MMOL/L (ref 22–29)
CREAT SERPL-MCNC: 0.91 MG/DL (ref 0.76–1.27)
DEPRECATED RDW RBC AUTO: 43.2 FL (ref 37–54)
EGFRCR SERPLBLD CKD-EPI 2021: 89.5 ML/MIN/1.73
ERYTHROCYTE [DISTWIDTH] IN BLOOD BY AUTOMATED COUNT: 11.8 % (ref 12.3–15.4)
GLUCOSE BLDC GLUCOMTR-MCNC: 151 MG/DL (ref 70–130)
GLUCOSE BLDC GLUCOMTR-MCNC: 165 MG/DL (ref 70–130)
GLUCOSE BLDC GLUCOMTR-MCNC: 177 MG/DL (ref 70–130)
GLUCOSE BLDC GLUCOMTR-MCNC: 182 MG/DL (ref 70–130)
GLUCOSE SERPL-MCNC: 137 MG/DL (ref 65–99)
HCT VFR BLD AUTO: 33.8 % (ref 37.5–51)
HGB BLD-MCNC: 11 G/DL (ref 13–17.7)
MCH RBC QN AUTO: 32.4 PG (ref 26.6–33)
MCHC RBC AUTO-ENTMCNC: 32.5 G/DL (ref 31.5–35.7)
MCV RBC AUTO: 99.4 FL (ref 79–97)
PLATELET # BLD AUTO: 169 10*3/MM3 (ref 140–450)
PMV BLD AUTO: 10.4 FL (ref 6–12)
POTASSIUM SERPL-SCNC: 4.3 MMOL/L (ref 3.5–5.2)
QT INTERVAL: 384 MS
QT INTERVAL: 449 MS
QTC INTERVAL: 381 MS
QTC INTERVAL: 442 MS
RBC # BLD AUTO: 3.4 10*6/MM3 (ref 4.14–5.8)
SODIUM SERPL-SCNC: 143 MMOL/L (ref 136–145)
WBC NRBC COR # BLD AUTO: 8.79 10*3/MM3 (ref 3.4–10.8)

## 2024-10-06 PROCEDURE — 76000 FLUOROSCOPY <1 HR PHYS/QHP: CPT

## 2024-10-06 PROCEDURE — 25010000002 LIDOCAINE 2% SOLUTION: Performed by: STUDENT IN AN ORGANIZED HEALTH CARE EDUCATION/TRAINING PROGRAM

## 2024-10-06 PROCEDURE — 0QH734Z INSERTION OF INTERNAL FIXATION DEVICE INTO LEFT UPPER FEMUR, PERCUTANEOUS APPROACH: ICD-10-PCS | Performed by: ORTHOPAEDIC SURGERY

## 2024-10-06 PROCEDURE — 93010 ELECTROCARDIOGRAM REPORT: CPT | Performed by: INTERNAL MEDICINE

## 2024-10-06 PROCEDURE — 94664 DEMO&/EVAL PT USE INHALER: CPT

## 2024-10-06 PROCEDURE — 80048 BASIC METABOLIC PNL TOTAL CA: CPT | Performed by: STUDENT IN AN ORGANIZED HEALTH CARE EDUCATION/TRAINING PROGRAM

## 2024-10-06 PROCEDURE — C1713 ANCHOR/SCREW BN/BN,TIS/BN: HCPCS | Performed by: ORTHOPAEDIC SURGERY

## 2024-10-06 PROCEDURE — 25010000002 SUGAMMADEX 200 MG/2ML SOLUTION: Performed by: STUDENT IN AN ORGANIZED HEALTH CARE EDUCATION/TRAINING PROGRAM

## 2024-10-06 PROCEDURE — 94799 UNLISTED PULMONARY SVC/PX: CPT

## 2024-10-06 PROCEDURE — 25010000002 HYDROMORPHONE PER 4 MG: Performed by: STUDENT IN AN ORGANIZED HEALTH CARE EDUCATION/TRAINING PROGRAM

## 2024-10-06 PROCEDURE — 25010000002 FENTANYL CITRATE (PF) 50 MCG/ML SOLUTION: Performed by: STUDENT IN AN ORGANIZED HEALTH CARE EDUCATION/TRAINING PROGRAM

## 2024-10-06 PROCEDURE — 25010000002 CEFAZOLIN PER 500 MG: Performed by: STUDENT IN AN ORGANIZED HEALTH CARE EDUCATION/TRAINING PROGRAM

## 2024-10-06 PROCEDURE — 82948 REAGENT STRIP/BLOOD GLUCOSE: CPT

## 2024-10-06 PROCEDURE — 25810000003 LACTATED RINGERS PER 1000 ML: Performed by: STUDENT IN AN ORGANIZED HEALTH CARE EDUCATION/TRAINING PROGRAM

## 2024-10-06 PROCEDURE — 85027 COMPLETE CBC AUTOMATED: CPT | Performed by: STUDENT IN AN ORGANIZED HEALTH CARE EDUCATION/TRAINING PROGRAM

## 2024-10-06 PROCEDURE — 25010000002 ONDANSETRON PER 1 MG: Performed by: STUDENT IN AN ORGANIZED HEALTH CARE EDUCATION/TRAINING PROGRAM

## 2024-10-06 PROCEDURE — 93005 ELECTROCARDIOGRAM TRACING: CPT | Performed by: INTERNAL MEDICINE

## 2024-10-06 PROCEDURE — 63710000001 INSULIN LISPRO (HUMAN) PER 5 UNITS: Performed by: ORTHOPAEDIC SURGERY

## 2024-10-06 PROCEDURE — 25010000002 DEXAMETHASONE SODIUM PHOSPHATE 20 MG/5ML SOLUTION: Performed by: STUDENT IN AN ORGANIZED HEALTH CARE EDUCATION/TRAINING PROGRAM

## 2024-10-06 PROCEDURE — 25010000002 CEFAZOLIN PER 500 MG: Performed by: ORTHOPAEDIC SURGERY

## 2024-10-06 PROCEDURE — 25010000002 PROPOFOL 10 MG/ML EMULSION: Performed by: STUDENT IN AN ORGANIZED HEALTH CARE EDUCATION/TRAINING PROGRAM

## 2024-10-06 DEVICE — TROCHANTERIC NAIL
Type: IMPLANTABLE DEVICE | Site: FEMUR | Status: FUNCTIONAL
Brand: GAMMA

## 2024-10-06 DEVICE — PRECISION PIN TAPERED
Type: IMPLANTABLE DEVICE | Site: FEMUR | Status: FUNCTIONAL
Brand: GAMMA

## 2024-10-06 DEVICE — LOCKING SCREW
Type: IMPLANTABLE DEVICE | Site: FEMUR | Status: FUNCTIONAL
Brand: T2 ALPHA

## 2024-10-06 DEVICE — LAG SCREW
Type: IMPLANTABLE DEVICE | Site: FEMUR | Status: FUNCTIONAL
Brand: GAMMA

## 2024-10-06 DEVICE — K-WIRE: Type: IMPLANTABLE DEVICE | Site: FEMUR | Status: FUNCTIONAL

## 2024-10-06 RX ORDER — ONDANSETRON 2 MG/ML
4 INJECTION INTRAMUSCULAR; INTRAVENOUS ONCE AS NEEDED
Status: DISCONTINUED | OUTPATIENT
Start: 2024-10-06 | End: 2024-10-06 | Stop reason: HOSPADM

## 2024-10-06 RX ORDER — DROPERIDOL 2.5 MG/ML
0.62 INJECTION, SOLUTION INTRAMUSCULAR; INTRAVENOUS
Status: DISCONTINUED | OUTPATIENT
Start: 2024-10-06 | End: 2024-10-06 | Stop reason: HOSPADM

## 2024-10-06 RX ORDER — NALOXONE HCL 0.4 MG/ML
0.2 VIAL (ML) INJECTION AS NEEDED
Status: DISCONTINUED | OUTPATIENT
Start: 2024-10-06 | End: 2024-10-06 | Stop reason: HOSPADM

## 2024-10-06 RX ORDER — ROCURONIUM BROMIDE 10 MG/ML
INJECTION, SOLUTION INTRAVENOUS AS NEEDED
Status: DISCONTINUED | OUTPATIENT
Start: 2024-10-06 | End: 2024-10-06 | Stop reason: SURG

## 2024-10-06 RX ORDER — HYDROCODONE BITARTRATE AND ACETAMINOPHEN 5; 325 MG/1; MG/1
1 TABLET ORAL ONCE AS NEEDED
Status: DISCONTINUED | OUTPATIENT
Start: 2024-10-06 | End: 2024-10-06 | Stop reason: HOSPADM

## 2024-10-06 RX ORDER — SODIUM CHLORIDE 0.9 % (FLUSH) 0.9 %
3-10 SYRINGE (ML) INJECTION AS NEEDED
Status: DISCONTINUED | OUTPATIENT
Start: 2024-10-06 | End: 2024-10-06 | Stop reason: HOSPADM

## 2024-10-06 RX ORDER — HYDRALAZINE HYDROCHLORIDE 20 MG/ML
5 INJECTION INTRAMUSCULAR; INTRAVENOUS
Status: DISCONTINUED | OUTPATIENT
Start: 2024-10-06 | End: 2024-10-06 | Stop reason: HOSPADM

## 2024-10-06 RX ORDER — SODIUM CHLORIDE 0.9 % (FLUSH) 0.9 %
3 SYRINGE (ML) INJECTION EVERY 12 HOURS SCHEDULED
Status: DISCONTINUED | OUTPATIENT
Start: 2024-10-06 | End: 2024-10-06 | Stop reason: HOSPADM

## 2024-10-06 RX ORDER — PROMETHAZINE HYDROCHLORIDE 25 MG/1
25 SUPPOSITORY RECTAL ONCE AS NEEDED
Status: DISCONTINUED | OUTPATIENT
Start: 2024-10-06 | End: 2024-10-06 | Stop reason: HOSPADM

## 2024-10-06 RX ORDER — EPHEDRINE SULFATE 50 MG/ML
5 INJECTION, SOLUTION INTRAVENOUS ONCE AS NEEDED
Status: DISCONTINUED | OUTPATIENT
Start: 2024-10-06 | End: 2024-10-06 | Stop reason: HOSPADM

## 2024-10-06 RX ORDER — ONDANSETRON 2 MG/ML
INJECTION INTRAMUSCULAR; INTRAVENOUS AS NEEDED
Status: DISCONTINUED | OUTPATIENT
Start: 2024-10-06 | End: 2024-10-06 | Stop reason: SURG

## 2024-10-06 RX ORDER — FENTANYL CITRATE 50 UG/ML
25 INJECTION, SOLUTION INTRAMUSCULAR; INTRAVENOUS
Status: DISCONTINUED | OUTPATIENT
Start: 2024-10-06 | End: 2024-10-06 | Stop reason: HOSPADM

## 2024-10-06 RX ORDER — DEXAMETHASONE SODIUM PHOSPHATE 4 MG/ML
INJECTION, SOLUTION INTRA-ARTICULAR; INTRALESIONAL; INTRAMUSCULAR; INTRAVENOUS; SOFT TISSUE AS NEEDED
Status: DISCONTINUED | OUTPATIENT
Start: 2024-10-06 | End: 2024-10-06 | Stop reason: SURG

## 2024-10-06 RX ORDER — LIDOCAINE HYDROCHLORIDE 20 MG/ML
INJECTION, SOLUTION INFILTRATION; PERINEURAL AS NEEDED
Status: DISCONTINUED | OUTPATIENT
Start: 2024-10-06 | End: 2024-10-06 | Stop reason: SURG

## 2024-10-06 RX ORDER — LIDOCAINE HYDROCHLORIDE 10 MG/ML
0.5 INJECTION, SOLUTION INFILTRATION; PERINEURAL ONCE AS NEEDED
Status: DISCONTINUED | OUTPATIENT
Start: 2024-10-06 | End: 2024-10-06 | Stop reason: HOSPADM

## 2024-10-06 RX ORDER — DIPHENHYDRAMINE HYDROCHLORIDE 50 MG/ML
12.5 INJECTION INTRAMUSCULAR; INTRAVENOUS
Status: DISCONTINUED | OUTPATIENT
Start: 2024-10-06 | End: 2024-10-06 | Stop reason: HOSPADM

## 2024-10-06 RX ORDER — HYDROMORPHONE HYDROCHLORIDE 2 MG/ML
INJECTION, SOLUTION INTRAMUSCULAR; INTRAVENOUS; SUBCUTANEOUS AS NEEDED
Status: DISCONTINUED | OUTPATIENT
Start: 2024-10-06 | End: 2024-10-06 | Stop reason: SURG

## 2024-10-06 RX ORDER — SODIUM CHLORIDE, SODIUM LACTATE, POTASSIUM CHLORIDE, CALCIUM CHLORIDE 600; 310; 30; 20 MG/100ML; MG/100ML; MG/100ML; MG/100ML
9 INJECTION, SOLUTION INTRAVENOUS CONTINUOUS
Status: ACTIVE | OUTPATIENT
Start: 2024-10-06 | End: 2024-10-07

## 2024-10-06 RX ORDER — MAGNESIUM HYDROXIDE 1200 MG/15ML
LIQUID ORAL AS NEEDED
Status: DISCONTINUED | OUTPATIENT
Start: 2024-10-06 | End: 2024-10-06 | Stop reason: HOSPADM

## 2024-10-06 RX ORDER — FENTANYL CITRATE 50 UG/ML
INJECTION, SOLUTION INTRAMUSCULAR; INTRAVENOUS AS NEEDED
Status: DISCONTINUED | OUTPATIENT
Start: 2024-10-06 | End: 2024-10-06 | Stop reason: SURG

## 2024-10-06 RX ORDER — CEFAZOLIN SODIUM 500 MG/2.2ML
INJECTION, POWDER, FOR SOLUTION INTRAMUSCULAR; INTRAVENOUS AS NEEDED
Status: DISCONTINUED | OUTPATIENT
Start: 2024-10-06 | End: 2024-10-06 | Stop reason: SURG

## 2024-10-06 RX ORDER — PROMETHAZINE HYDROCHLORIDE 25 MG/1
25 TABLET ORAL ONCE AS NEEDED
Status: DISCONTINUED | OUTPATIENT
Start: 2024-10-06 | End: 2024-10-06 | Stop reason: HOSPADM

## 2024-10-06 RX ORDER — PROPOFOL 10 MG/ML
VIAL (ML) INTRAVENOUS AS NEEDED
Status: DISCONTINUED | OUTPATIENT
Start: 2024-10-06 | End: 2024-10-06 | Stop reason: SURG

## 2024-10-06 RX ORDER — HYDROCODONE BITARTRATE AND ACETAMINOPHEN 7.5; 325 MG/1; MG/1
1 TABLET ORAL EVERY 4 HOURS PRN
Status: DISCONTINUED | OUTPATIENT
Start: 2024-10-06 | End: 2024-10-06 | Stop reason: HOSPADM

## 2024-10-06 RX ORDER — BUPIVACAINE HYDROCHLORIDE AND EPINEPHRINE 5; 5 MG/ML; UG/ML
INJECTION, SOLUTION EPIDURAL; INTRACAUDAL; PERINEURAL AS NEEDED
Status: DISCONTINUED | OUTPATIENT
Start: 2024-10-06 | End: 2024-10-06 | Stop reason: HOSPADM

## 2024-10-06 RX ORDER — FENTANYL CITRATE 50 UG/ML
50 INJECTION, SOLUTION INTRAMUSCULAR; INTRAVENOUS ONCE AS NEEDED
Status: DISCONTINUED | OUTPATIENT
Start: 2024-10-06 | End: 2024-10-06 | Stop reason: HOSPADM

## 2024-10-06 RX ORDER — HYDROMORPHONE HYDROCHLORIDE 1 MG/ML
0.25 INJECTION, SOLUTION INTRAMUSCULAR; INTRAVENOUS; SUBCUTANEOUS
Status: DISCONTINUED | OUTPATIENT
Start: 2024-10-06 | End: 2024-10-06 | Stop reason: HOSPADM

## 2024-10-06 RX ORDER — FLUMAZENIL 0.1 MG/ML
0.2 INJECTION INTRAVENOUS AS NEEDED
Status: DISCONTINUED | OUTPATIENT
Start: 2024-10-06 | End: 2024-10-06 | Stop reason: HOSPADM

## 2024-10-06 RX ORDER — IPRATROPIUM BROMIDE AND ALBUTEROL SULFATE 2.5; .5 MG/3ML; MG/3ML
3 SOLUTION RESPIRATORY (INHALATION) ONCE AS NEEDED
Status: DISCONTINUED | OUTPATIENT
Start: 2024-10-06 | End: 2024-10-06 | Stop reason: HOSPADM

## 2024-10-06 RX ORDER — LABETALOL HYDROCHLORIDE 5 MG/ML
5 INJECTION, SOLUTION INTRAVENOUS
Status: DISCONTINUED | OUTPATIENT
Start: 2024-10-06 | End: 2024-10-06 | Stop reason: HOSPADM

## 2024-10-06 RX ADMIN — CARBIDOPA AND LEVODOPA 1 TABLET: 25; 100 TABLET ORAL at 21:08

## 2024-10-06 RX ADMIN — Medication 10 ML: at 21:09

## 2024-10-06 RX ADMIN — FENTANYL CITRATE 50 MCG: 50 INJECTION, SOLUTION INTRAMUSCULAR; INTRAVENOUS at 12:09

## 2024-10-06 RX ADMIN — CARBIDOPA AND LEVODOPA 1 TABLET: 25; 100 TABLET ORAL at 17:10

## 2024-10-06 RX ADMIN — ROCURONIUM BROMIDE 50 MG: 10 INJECTION, SOLUTION INTRAVENOUS at 12:09

## 2024-10-06 RX ADMIN — INSULIN LISPRO 2 UNITS: 100 INJECTION, SOLUTION INTRAVENOUS; SUBCUTANEOUS at 21:35

## 2024-10-06 RX ADMIN — IPRATROPIUM BROMIDE AND ALBUTEROL SULFATE 3 ML: .5; 3 SOLUTION RESPIRATORY (INHALATION) at 06:50

## 2024-10-06 RX ADMIN — CEFAZOLIN 2 G: 225 INJECTION, POWDER, FOR SOLUTION INTRAMUSCULAR; INTRAVENOUS at 12:36

## 2024-10-06 RX ADMIN — Medication 5 MG: at 21:08

## 2024-10-06 RX ADMIN — HYDROMORPHONE HYDROCHLORIDE 0.5 MG: 2 INJECTION, SOLUTION INTRAMUSCULAR; INTRAVENOUS; SUBCUTANEOUS at 12:51

## 2024-10-06 RX ADMIN — APIXABAN 2.5 MG: 2.5 TABLET, FILM COATED ORAL at 21:08

## 2024-10-06 RX ADMIN — METFORMIN HYDROCHLORIDE 1000 MG: 1000 TABLET, FILM COATED ORAL at 17:10

## 2024-10-06 RX ADMIN — DEXAMETHASONE SODIUM PHOSPHATE 8 MG: 4 INJECTION, SOLUTION INTRAMUSCULAR; INTRAVENOUS at 12:09

## 2024-10-06 RX ADMIN — ONDANSETRON 4 MG: 2 INJECTION INTRAMUSCULAR; INTRAVENOUS at 13:14

## 2024-10-06 RX ADMIN — ROPINIROLE 2 MG: 2 TABLET, FILM COATED ORAL at 21:07

## 2024-10-06 RX ADMIN — AMANTADINE HYDROCHLORIDE 100 MG: 100 CAPSULE ORAL at 21:08

## 2024-10-06 RX ADMIN — METOPROLOL TARTRATE 50 MG: 50 TABLET, FILM COATED ORAL at 07:53

## 2024-10-06 RX ADMIN — PROPOFOL 100 MG: 10 INJECTION, EMULSION INTRAVENOUS at 12:09

## 2024-10-06 RX ADMIN — IPRATROPIUM BROMIDE AND ALBUTEROL SULFATE 3 ML: .5; 3 SOLUTION RESPIRATORY (INHALATION) at 15:29

## 2024-10-06 RX ADMIN — SUGAMMADEX 200 MG: 100 INJECTION, SOLUTION INTRAVENOUS at 13:14

## 2024-10-06 RX ADMIN — INSULIN LISPRO 2 UNITS: 100 INJECTION, SOLUTION INTRAVENOUS; SUBCUTANEOUS at 17:10

## 2024-10-06 RX ADMIN — LIDOCAINE HYDROCHLORIDE 60 MG: 20 INJECTION, SOLUTION INFILTRATION; PERINEURAL at 12:09

## 2024-10-06 RX ADMIN — SODIUM CHLORIDE, POTASSIUM CHLORIDE, SODIUM LACTATE AND CALCIUM CHLORIDE 9 ML/HR: 600; 310; 30; 20 INJECTION, SOLUTION INTRAVENOUS at 09:14

## 2024-10-06 RX ADMIN — IPRATROPIUM BROMIDE AND ALBUTEROL SULFATE 3 ML: .5; 3 SOLUTION RESPIRATORY (INHALATION) at 21:25

## 2024-10-06 RX ADMIN — SODIUM CHLORIDE 2000 MG: 900 INJECTION INTRAVENOUS at 21:08

## 2024-10-06 NOTE — ANESTHESIA PREPROCEDURE EVALUATION
Anesthesia Evaluation     Patient summary reviewed and Nursing notes reviewed   no history of anesthetic complications:   NPO Solid Status: > 8 hours  NPO Liquid Status: > 2 hours           Airway   Mallampati: II  TM distance: >3 FB  Neck ROM: full  Dental    (+) edentulous    Pulmonary    Cardiovascular     (+) hypertension, CAD, CHF       Neuro/Psych  (+) Parkinson's disease  GI/Hepatic/Renal/Endo    (+) diabetes mellitus type 2    Musculoskeletal     Abdominal    Substance History      OB/GYN          Other                      Anesthesia Plan    ASA 3     general     intravenous induction     Anesthetic plan, risks, benefits, and alternatives have been provided, discussed and informed consent has been obtained with: patient.      CODE STATUS:    Code Status (Patient has no pulse and is not breathing): CPR (Attempt to Resuscitate)  Medical Interventions (Patient has pulse or is breathing): Full Support

## 2024-10-06 NOTE — OP NOTE
FEMUR INTRAMEDULLARY NAILING/RODDING  Procedure Report    Patient Name:  Deonte Harrell  YOB: 1950    Date of Surgery:  10/6/2024     Indications: Mr. Harrell is a 74-year-old male with a left intertrochanteric femur fracture.  I discussed with the patient and with his family the risks and benefits of intramedullary fixation of his left hip fracture and they agreed to go forward.    Pre-op Diagnosis:   Left hip intertrochanteric femur fracture       Post-Op Diagnosis Codes:  Same    Procedure/CPT® Codes:      Procedure(s):  FEMUR INTRAMEDULLARY NAILING/RODDING      Staff:  Surgeon(s):  Gene Norris MD         Anesthesia: General    Estimated Blood Loss: 50 mL    Implants:    Implant Name Type Inv. Item Serial No.  Lot No. LRB No. Used Action   PIN PRECISION TPR 3.2/3.5A519XK - [id number] Implant PIN PRECISION TPR 3.2/3.5K473CG  ASHLYN DAVE [id number] Left 1 Implanted   KWIRE 3.8X588GE NS STRL - [id number] Implant KWIRE [id number] NS STRL  ASHLYN DAVE [id number] Left 1 Implanted   NAIL FEM TROCH 125DEG [id number] STRL - [id number] Implant NAIL FEM TROCH 125DEG [id number] STRL  ASHLYN DAVE [id number] Left 1 Implanted   SCRW LAG HIP GAMMA4 10.7Q012YY - [id number] Implant SCRW LAG HIP GAMMA4 [id number]  ASHLYN DAVE [id number] Left 1 Implanted   SCRW LK T2 ALPHA TI 5X40MM STRL - [id number] Implant SCRW LK T2 ALPHA TI 5X40MM STRL  ASHLYN DAVE [id number] Left 1 Implanted       Specimen:          None        Findings: Left hip fracture    Complications: None    Description of Procedure: I saw the patient in preop with his family.  We discussed that he had an intertrochanteric femur fracture.  We discussed the risks and benefits of intramedullary fixation including but not limited to bleeding, infection, nerve injury, vascular injury, malunion, nonunion, hardware failure, need for future surgery, pneumonia, bladder infection, blood clots, heart attack, stroke, death.  They understood the risks and  his family signed the consent.  I signed my part of the consent.  I then put my initials on the left hip.  He was then brought back to the operating room, put under general anesthesia, and intubated.  He was placed on a [place] table with both feet in boots.  The right foot was lowered to scissor the hips and allow for a lateral view of the left hip.  He was prepped and draped in the usual manner.  We then took a timeout to confirm his name, his birthdate, and his procedure, his allergies, his CODE STATUS, and his antibiotics.  I then marked the tip of the greater trochanter on the AP view and a line in line with the femoral shaft on the lateral view.  I then made an incision 2 fingerbreadths proximal to the tip of the greater trochanter in line with the femoral shaft on the lateral hip.  I then cut down through the subcutaneous tissue and fascia.  I was then able to put a guidepin to the tip of the greater trochanter.  I adjusted the position of the guidepin based on the AP and lateral views.  I then malleted the guidepin into the proximal femur.  I then used the proximal reamer to ream the proximal femur.  I then placed an 11 x 170 x 125 gamma 4 nail.  I then made an incision for the femoral neck guide and put the guide down to the bone.  I then put a guidewire into the femoral neck and it was appropriately placed on the AP and lateral views.  I then measured and it measured 115 mm.  I then drilled and then placed the 115 mm screw.  I compressed a small amount through the fracture.  I then locked in the setscrew and then turned it back a quarter turn.  I then removed the femoral neck guide and placed the static locking screw guide and made an incision for that.  I put it down to the bone.  I drilled for the static locking screw.  I placed a 40 mm screw.  I got a lateral view to show that the screw was through the nail.  I then removed the radiolucent guide and got final AP and lateral views.  I then irrigated all 3  incisions.  I closed the fascia with 0 Vicryl.  I then closed in layers with 2-0 Vicryl and running Monocryl.  He was dressed with Dermabond, sterile dressings.        Gene Norris MD     Date: 10/6/2024  Time: 13:45 EDT

## 2024-10-06 NOTE — PLAN OF CARE
Goal Outcome Evaluation:  Plan of Care Reviewed With: patient, spouse, daughter        Progress: improving  Outcome Evaluation: POD x0 left hip imn. dsg cdi. vss. achs with ssi. 2L PO. WBAT. reg diet. meds crushed with applesauce/pudding. FC intact. q2turn. educated on glucose monitoring. plan to dc to snf when appropriate. con't plan of care.

## 2024-10-06 NOTE — ANESTHESIA POSTPROCEDURE EVALUATION
Patient: Deonte Harrell    Procedure Summary       Date: 10/06/24 Room / Location: [facility] OR [address] [place] [address] MAIN OR    Anesthesia Start: 1205 Anesthesia Stop: 1343    Procedure: FEMUR INTRAMEDULLARY NAILING/RODDING (Left: Thigh) Diagnosis:     Surgeons: Gene Norris MD Provider: Kartik Powell MD    Anesthesia Type: general ASA Status: 3            Anesthesia Type: general    Vitals  Vitals Value Taken Time   /77 10/06/24 1430   Temp     Pulse 65 10/06/24 1436   Resp     SpO2 97 % 10/06/24 1436   Vitals shown include unfiled device data.        Anesthesia Post Evaluation

## 2024-10-06 NOTE — PLAN OF CARE
Goal Outcome Evaluation:  Plan of Care Reviewed With: patient        Progress: no change    A/Ox2-3. Garbled speech. No s/s distress. Instructed and kept on NPO after midnight. Pain controlled w/ PRN [place]. SLIV. Plan for left femur IM nailing/rodding on 10/6/24 at 1020am. Consent signed by spouse/POA. Voiding per Izaguirre. Last BM on 10/5. Needs attended.

## 2024-10-06 NOTE — ANESTHESIA PROCEDURE NOTES
Airway  Urgency: elective    Date/Time: 10/6/2024 12:13 PM  Airway not difficult    General Information and Staff    Patient location during procedure: OR  Anesthesiologist: Kartik Powell MD    Indications and Patient Condition  Indications for airway management: airway protection    Preoxygenated: yes  MILS maintained throughout  Mask difficulty assessment: 3 - difficult mask (inadequate, unstable or two providers) +/- NMBA    Final Airway Details  Final airway type: endotracheal airway      Successful airway: ETT  Cuffed: yes   Successful intubation technique: direct laryngoscopy  Endotracheal tube insertion site: oral  Blade: Carmella  Blade size: 3  ETT size (mm): 7.5  Cormack-Lehane Classification: grade I - full view of glottis  Placement verified by: chest auscultation and capnometry   Measured from: teeth  Number of attempts at approach: 1  Assessment: lips, teeth, and gum same as pre-op and atraumatic intubation

## 2024-10-06 NOTE — PROGRESS NOTES
Name: Deonte Harrell ADMIT: 10/4/2024   : 1950  PCP: Provider, No Known    MRN: [id number] LOS: 2 days   AGE/SEX: 74 y.o. male  ROOM: [facility] OR/MAIN OR   Subjective   No chief complaint on file.  Cc- hip pain    Slept well  To have surgery today  NPO  Family at bedside      Objective   Vital Signs  Temp:  [97.8 °F (36.6 °C)-98.8 °F (37.1 °C)] 98.6 °F (37 °C)  Heart Rate:  [51-74] 51  Resp:  [16-18] 18  BP: (102-124)/(54-72) 113/66  SpO2:  [92 %-96 %] 92 %  on   ;   Device (Oxygen Therapy): room air  Body mass index is 27.84 kg/m².    Physical Exam  HENT:      Head: Normocephalic and atraumatic.   Eyes:      General: No scleral icterus.  Cardiovascular:      Rate and Rhythm: Normal rate and regular rhythm.      Heart sounds: Normal heart sounds.   Pulmonary:      Effort: Pulmonary effort is normal. No respiratory distress.      Breath sounds: Normal breath sounds.   Abdominal:      General: There is no distension.      Palpations: Abdomen is soft.      Tenderness: There is no abdominal tenderness.   Musculoskeletal:         General: Tenderness, deformity and signs of injury present.      Cervical back: Neck supple.     Elderly, chronically ill  Sleeping this morning  Seen in pre-op area with family at bedside    Results Review:       I reviewed the patient's new clinical results.  Results from last 7 days   Lab Units 10/06/24  0345 10/05/24  0416   WBC 10*3/mm3 8.79 8.34   HEMOGLOBIN g/dL 11.0* 11.9*   PLATELETS 10*3/mm3 169 178     Results from last 7 days   Lab Units 10/06/24  0345 10/05/24  0416   SODIUM mmol/L 143 139   POTASSIUM mmol/L 4.3 3.8   CHLORIDE mmol/L 106 101   CO2 mmol/L 31.4* 27.3   BUN mg/dL 14 11   CREATININE mg/dL 0.91 0.63*   GLUCOSE mg/dL 137* 160*   Estimated Creatinine Clearance: 99.3 mL/min (by C-G formula based on SCr of 0.91 mg/dL).  Results from last 7 days   Lab Units 10/05/24  0416   ALBUMIN g/dL 3.6   BILIRUBIN mg/dL 0.8   ALK PHOS U/L 93   AST (SGOT) U/L 12   ALT  "(SGPT) U/L 11     Results from last 7 days   Lab Units 10/06/24  0345 10/05/24  0416   CALCIUM mg/dL 8.9 8.5*   ALBUMIN g/dL  --  3.6   MAGNESIUM mg/dL  --  1.7   PHOSPHORUS mg/dL  --  2.9         Coag     HbA1C   Lab Results   Component Value Date    HGBA1C 6.70 (H) 10/05/2024     Infection     Radiology(recent) XR Femur 2 View Left    Result Date: 10/5/2024   Left intertrochanteric fracture.    This report was finalized on 10/5/2024 7:00 AM by Dr. Jean Paul Lanier M.D on Workstation: [facility]      XR Pelvis 1 or 2 View    Result Date: 10/5/2024   Left intertrochanteric fracture.    This report was finalized on 10/5/2024 7:00 AM by Dr. Jean Paul Lanier M.D on Workstation: [facility]     No results found for: \"TROPONINT\", \"TROPONINI\", \"BNP\"  No components found for: \"TSH;2\"    [Transfer Hold] amantadine, 100 mg, Oral, Q12H  [Transfer Hold] carbidopa-levodopa, 1 tablet, Oral, TID  [Transfer Hold] cholecalciferol, 1,000 Units, Oral, Daily  [Transfer Hold] insulin lispro, 2-7 Units, Subcutaneous, 4x Daily AC & at Bedtime  [Transfer Hold] ipratropium-albuterol, 3 mL, Nebulization, 4x Daily - RT  lisinopril, 5 mg, Oral, Daily  [Transfer Hold] melatonin, 5 mg, Oral, Nightly  [Transfer Hold] metFORMIN, 1,000 mg, Oral, BID With Meals  metoprolol tartrate, 50 mg, Oral, BID  [Transfer Hold] pantoprazole, 40 mg, Oral, Q AM  [Transfer Hold] polyethylene glycol, 17 g, Oral, BID  potassium chloride, 10 mEq, Oral, Daily  [Transfer Hold] QUEtiapine, 25 mg, Oral, Nightly  [Transfer Hold] rOPINIRole, 2 mg, Oral, Nightly  [Transfer Hold] sennosides-docusate, 1 tablet, Oral, BID  [Transfer Hold] sodium chloride, 10 mL, Intravenous, Q12H  sodium chloride, 3 mL, Intravenous, Q12H      lactated ringers, 9 mL/hr, Last Rate: 9 mL/hr (10/06/24 0914)    NPO Diet NPO Type: Sips with Meds      Assessment & Plan      Active Hospital Problems    Diagnosis  POA    **Hip fracture [S72.009A]  Yes    Coronary atherosclerosis of native coronary " artery [I25.10]  Unknown    PVD (peripheral vascular disease) [I73.9]  Unknown    Parkinson disease [G20.A1]  Unknown    T2DM (type 2 diabetes mellitus) [E11.9]  Unknown      Resolved Hospital Problems   No resolved problems to display.       Left hip fracture  Orthopedic surgery has been consulted and plans for OR today  prn agents for pain  No chest pain.  pre-op baseline EKG obtained but no indications for additional cardiac workup prior to urgent surgery.      Dysarthria  Parkinson's disease  Likely a complication of Parkinson's disorder  Resumed home parkinsons medications  Wife has turned off his stimulator for surgery     T2DM  SSI  Resume metformin           VTE Prophylaxis - SCDs.      WILLIAN RN  WILLIAN family     Dispo- likely SNF on Tuesday  Gucci Stafford MD  [facility]  10/06/24  14:37 EDT

## 2024-10-07 LAB
ANION GAP SERPL CALCULATED.3IONS-SCNC: 9 MMOL/L (ref 5–15)
BUN SERPL-MCNC: 20 MG/DL (ref 8–23)
BUN/CREAT SERPL: 29 (ref 7–25)
CALCIUM SPEC-SCNC: 8.6 MG/DL (ref 8.6–10.5)
CHLORIDE SERPL-SCNC: 104 MMOL/L (ref 98–107)
CO2 SERPL-SCNC: 29 MMOL/L (ref 22–29)
CREAT SERPL-MCNC: 0.69 MG/DL (ref 0.76–1.27)
DEPRECATED RDW RBC AUTO: 43 FL (ref 37–54)
EGFRCR SERPLBLD CKD-EPI 2021: 98.3 ML/MIN/1.73
ERYTHROCYTE [DISTWIDTH] IN BLOOD BY AUTOMATED COUNT: 11.8 % (ref 12.3–15.4)
GLUCOSE BLDC GLUCOMTR-MCNC: 142 MG/DL (ref 70–130)
GLUCOSE BLDC GLUCOMTR-MCNC: 163 MG/DL (ref 70–130)
GLUCOSE BLDC GLUCOMTR-MCNC: 169 MG/DL (ref 70–130)
GLUCOSE BLDC GLUCOMTR-MCNC: 173 MG/DL (ref 70–130)
GLUCOSE SERPL-MCNC: 169 MG/DL (ref 65–99)
HCT VFR BLD AUTO: 32.5 % (ref 37.5–51)
HGB BLD-MCNC: 10.5 G/DL (ref 13–17.7)
MCH RBC QN AUTO: 31.9 PG (ref 26.6–33)
MCHC RBC AUTO-ENTMCNC: 32.3 G/DL (ref 31.5–35.7)
MCV RBC AUTO: 98.8 FL (ref 79–97)
PLATELET # BLD AUTO: 188 10*3/MM3 (ref 140–450)
PMV BLD AUTO: 10.9 FL (ref 6–12)
POTASSIUM SERPL-SCNC: 4.4 MMOL/L (ref 3.5–5.2)
RBC # BLD AUTO: 3.29 10*6/MM3 (ref 4.14–5.8)
SODIUM SERPL-SCNC: 142 MMOL/L (ref 136–145)
WBC NRBC COR # BLD AUTO: 9.22 10*3/MM3 (ref 3.4–10.8)

## 2024-10-07 PROCEDURE — 63710000001 INSULIN LISPRO (HUMAN) PER 5 UNITS: Performed by: ORTHOPAEDIC SURGERY

## 2024-10-07 PROCEDURE — 97535 SELF CARE MNGMENT TRAINING: CPT

## 2024-10-07 PROCEDURE — 80048 BASIC METABOLIC PNL TOTAL CA: CPT | Performed by: ORTHOPAEDIC SURGERY

## 2024-10-07 PROCEDURE — 97110 THERAPEUTIC EXERCISES: CPT | Performed by: PHYSICAL THERAPIST

## 2024-10-07 PROCEDURE — 97166 OT EVAL MOD COMPLEX 45 MIN: CPT

## 2024-10-07 PROCEDURE — 94664 DEMO&/EVAL PT USE INHALER: CPT

## 2024-10-07 PROCEDURE — 94761 N-INVAS EAR/PLS OXIMETRY MLT: CPT

## 2024-10-07 PROCEDURE — 25010000002 CEFAZOLIN PER 500 MG: Performed by: ORTHOPAEDIC SURGERY

## 2024-10-07 PROCEDURE — 94799 UNLISTED PULMONARY SVC/PX: CPT

## 2024-10-07 PROCEDURE — 85027 COMPLETE CBC AUTOMATED: CPT | Performed by: ORTHOPAEDIC SURGERY

## 2024-10-07 PROCEDURE — 82948 REAGENT STRIP/BLOOD GLUCOSE: CPT

## 2024-10-07 PROCEDURE — 97162 PT EVAL MOD COMPLEX 30 MIN: CPT | Performed by: PHYSICAL THERAPIST

## 2024-10-07 RX ADMIN — SODIUM CHLORIDE 2000 MG: 900 INJECTION INTRAVENOUS at 12:47

## 2024-10-07 RX ADMIN — Medication 10 ML: at 08:48

## 2024-10-07 RX ADMIN — Medication 10 ML: at 22:05

## 2024-10-07 RX ADMIN — POTASSIUM CHLORIDE 10 MEQ: 750 TABLET, EXTENDED RELEASE ORAL at 08:47

## 2024-10-07 RX ADMIN — CARBIDOPA AND LEVODOPA 1 TABLET: 25; 100 TABLET ORAL at 22:05

## 2024-10-07 RX ADMIN — QUETIAPINE FUMARATE 25 MG: 25 TABLET ORAL at 22:05

## 2024-10-07 RX ADMIN — APIXABAN 2.5 MG: 2.5 TABLET, FILM COATED ORAL at 22:05

## 2024-10-07 RX ADMIN — AMANTADINE HYDROCHLORIDE 100 MG: 100 CAPSULE ORAL at 08:47

## 2024-10-07 RX ADMIN — METOPROLOL TARTRATE 50 MG: 50 TABLET, FILM COATED ORAL at 22:06

## 2024-10-07 RX ADMIN — INSULIN LISPRO 2 UNITS: 100 INJECTION, SOLUTION INTRAVENOUS; SUBCUTANEOUS at 08:49

## 2024-10-07 RX ADMIN — IPRATROPIUM BROMIDE AND ALBUTEROL SULFATE 3 ML: .5; 3 SOLUTION RESPIRATORY (INHALATION) at 06:48

## 2024-10-07 RX ADMIN — SODIUM CHLORIDE 2000 MG: 900 INJECTION INTRAVENOUS at 05:03

## 2024-10-07 RX ADMIN — CARBIDOPA AND LEVODOPA 1 TABLET: 25; 100 TABLET ORAL at 16:56

## 2024-10-07 RX ADMIN — METFORMIN HYDROCHLORIDE 1000 MG: 1000 TABLET, FILM COATED ORAL at 08:47

## 2024-10-07 RX ADMIN — AMANTADINE HYDROCHLORIDE 100 MG: 100 CAPSULE ORAL at 22:05

## 2024-10-07 RX ADMIN — Medication 5 MG: at 22:05

## 2024-10-07 RX ADMIN — CARBIDOPA AND LEVODOPA 1 TABLET: 25; 100 TABLET ORAL at 08:47

## 2024-10-07 RX ADMIN — INSULIN LISPRO 2 UNITS: 100 INJECTION, SOLUTION INTRAVENOUS; SUBCUTANEOUS at 22:05

## 2024-10-07 RX ADMIN — IPRATROPIUM BROMIDE AND ALBUTEROL SULFATE 3 ML: .5; 3 SOLUTION RESPIRATORY (INHALATION) at 11:15

## 2024-10-07 RX ADMIN — INSULIN LISPRO 2 UNITS: 100 INJECTION, SOLUTION INTRAVENOUS; SUBCUTANEOUS at 16:56

## 2024-10-07 RX ADMIN — ROPINIROLE 2 MG: 2 TABLET, FILM COATED ORAL at 22:05

## 2024-10-07 RX ADMIN — APIXABAN 2.5 MG: 2.5 TABLET, FILM COATED ORAL at 08:49

## 2024-10-07 RX ADMIN — METFORMIN HYDROCHLORIDE 1000 MG: 1000 TABLET, FILM COATED ORAL at 17:05

## 2024-10-07 RX ADMIN — IPRATROPIUM BROMIDE AND ALBUTEROL SULFATE 3 ML: .5; 3 SOLUTION RESPIRATORY (INHALATION) at 14:49

## 2024-10-07 RX ADMIN — SENNOSIDES AND DOCUSATE SODIUM 1 TABLET: 50; 8.6 TABLET ORAL at 22:05

## 2024-10-07 RX ADMIN — HYDROCODONE BITARTRATE AND ACETAMINOPHEN 1 TABLET: 7.5; 325 TABLET ORAL at 05:14

## 2024-10-07 RX ADMIN — IPRATROPIUM BROMIDE AND ALBUTEROL SULFATE 3 ML: .5; 3 SOLUTION RESPIRATORY (INHALATION) at 19:57

## 2024-10-07 RX ADMIN — Medication 1000 UNITS: at 08:47

## 2024-10-07 RX ADMIN — SENNOSIDES AND DOCUSATE SODIUM 1 TABLET: 50; 8.6 TABLET ORAL at 08:47

## 2024-10-07 RX ADMIN — LANSOPRAZOLE 30 MG: 15 TABLET, ORALLY DISINTEGRATING ORAL at 05:09

## 2024-10-07 RX ADMIN — HYDROCODONE BITARTRATE AND ACETAMINOPHEN 1 TABLET: 7.5; 325 TABLET ORAL at 12:46

## 2024-10-07 NOTE — CASE MANAGEMENT/SOCIAL WORK
Continued Stay Note  [facility]     Patient Name: Deonte Harrell  MRN: [id number]  Today's Date: 10/7/2024    Admit Date: 10/4/2024    Plan: return to [facility] arranged for 10/8 @ 1100   Discharge Plan       Row Name 10/07/24 1502       Plan    Plan return to [facility] arranged for 10/8 @ 1100    Patient/Family in Agreement with Plan yes    Plan Comments Anticipate dc tomorrow. [facility] arranged for 10/8 @ 1100. MD and spouse aware. Spoke with Anita/Keyana who is verifying benefits and will notify CCP if there are any issues. Transfer packet in cubbie. CCP will follow.                   Discharge Codes    No documentation.                 Expected Discharge Date and Time       Expected Discharge Date Expected Discharge Time    Oct 8, 2024               Jammie Scott RN

## 2024-10-07 NOTE — PLAN OF CARE
Goal Outcome Evaluation:  Plan of Care Reviewed With: patient        Progress: no change    A/Ox2-3. Garbled speech. No s/s distress. Left hip dressing C/D/I. Ice pack applied to the left hip. On Cefazolin q8H x 1day. Pain controlled w/ PRN [place]. On Eliquis q12H. Voiding per Izaguirre. Last BM on 10/6. SCD's on BLE. Needs attended.

## 2024-10-07 NOTE — THERAPY EVALUATION
Patient Name: Deonte Harrell  : 1950    MRN: [id number]                              Today's Date: 10/7/2024       Admit Date: 10/4/2024    Visit Dx:     ICD-10-CM ICD-9-CM   1. Closed fracture of left hip, initial encounter  S72.002A 820.8     Patient Active Problem List   Diagnosis    Hip fracture    Coronary atherosclerosis of native coronary artery    PVD (peripheral vascular disease)    Parkinson disease    T2DM (type 2 diabetes mellitus)     Past Medical History:   Diagnosis Date    CHF (congestive heart failure)     Diabetes mellitus     Hypertension      Past Surgical History:   Procedure Laterality Date    FEMUR IM NAILING/RODDING Left 10/6/2024    Procedure: FEMUR INTRAMEDULLARY NAILING/RODDING;  Surgeon: Gene Norris MD;  Location: [facility];  Service: Orthopedics;  Laterality: Left;      General Information       Row Name 10/07/24 1025          OT Time and Intention    Document Type evaluation  -RB     Mode of Treatment individual therapy;occupational therapy  -RB       Row Name 10/07/24 1025          General Information    Patient Profile Reviewed yes  -RB     Prior Level of Function ADL's;transfer;min assist:  -RB     Existing Precautions/Restrictions fall;weight bearing  -RB     Barriers to Rehab previous functional deficit;cognitive status;medically complex  -RB       Row Name 10/07/24 1025          Living Environment    People in Home facility resident  -RB       Row Name 10/07/24 1025          Cognition    Orientation Status (Cognition) oriented to;place;person;verbal cues/prompts needed for orientation  -RB       Row Name 10/07/24 1025          Safety Issues, Functional Mobility    Safety Issues Affecting Function (Mobility) safety precautions follow-through/compliance;safety precaution awareness;sequencing abilities;insight into deficits/self-awareness;awareness of need for assistance  -RB     Impairments Affecting Function (Mobility) balance;strength;endurance/activity  tolerance;pain;range of motion (ROM);postural/trunk control  -RB               User Key  (r) = Recorded By, (t) = Taken By, (c) = Cosigned By      Initials Name Provider Type    RB Justa Carranza OT Occupational Therapist                     Mobility/ADL's       Row Name 10/07/24 1026          Bed Mobility    Bed Mobility supine-sit;sit-supine  -RB     Supine-Sit [place] (Bed Mobility) maximum assist (25% patient effort);2 person assist;verbal cues  -RB     Sit-Supine [place] (Bed Mobility) maximum assist (25% patient effort);2 person assist;verbal cues  -RB     Bed Mobility, Safety Issues decreased use of legs for bridging/pushing  -RB     Assistive Device (Bed Mobility) bed rails  -RB       Row Name 10/07/24 1026          Transfers    Transfers sit-stand transfer;stand-sit transfer  -RB     Comment, (Transfers) stood several times with x2 assist and the nursing assistant assisting with brief/linen management  -RB       Row Name 10/07/24 1026          Sit-Stand Transfer    Sit-Stand [place] (Transfers) 2 person assist;verbal cues;moderate assist (50% patient effort)  -RB     Assistive Device (Sit-Stand Transfers) walker, front-wheeled  -RB       Row Name 10/07/24 1026          Stand-Sit Transfer    Stand-Sit [place] (Transfers) moderate assist (50% patient effort);2 person assist;verbal cues  -RB     Assistive Device (Stand-Sit Transfers) walker, front-wheeled  -RB       Row Name 10/07/24 1026          Functional Mobility    Functional Mobility- Ind. Level not tested;unable to perform  -RB       Row Name 10/07/24 1026          Activities of Daily Living    BADL Assessment/Intervention lower body dressing;grooming;toileting  -RB       Row Name 10/07/24 1026          Lower Body Dressing Assessment/Training    [place] Level (Lower Body Dressing) lower body dressing skills;dependent (less than 25% patient effort)  -RB     Position (Lower Body Dressing) edge of bed sitting  -RB       Row  Name 10/07/24 1026          Grooming Assessment/Training    [place] Level (Grooming) grooming skills;verbal cues;minimum assist (75% patient effort)  -RB     Position (Grooming) supported sitting  -RB               User Key  (r) = Recorded By, (t) = Taken By, (c) = Cosigned By      Initials Name Provider Type    RB Justa Carranza OT Occupational Therapist                   Obj/Interventions       Row Name 10/07/24 1028          Sensory Assessment (Somatosensory)    Sensory Assessment (Somatosensory) sensation intact  -RB       Row Name 10/07/24 1028          Vision Assessment/Intervention    Visual Impairment/Limitations WFL  -RB       Row Name 10/07/24 1028          Range of Motion Comprehensive    Comment, General Range of Motion LLE impaired ROM due to pain and weakness at fx site  -RB       Row Name 10/07/24 1028          Strength Comprehensive (MMT)    Comment, General Manual Muscle Testing (MMT) Assessment LLE post op weakness.  -RB       Row Name 10/07/24 1028          Shoulder (Therapeutic Exercise)    Shoulder (Therapeutic Exercise) AAROM (active assistive range of motion)  -RB     Shoulder AAROM (Therapeutic Exercise) bilateral;flexion;extension;aBduction;aDduction;sitting;10 repetitions  -RB       Row Name 10/07/24 1028          Motor Skills    Therapeutic Exercise shoulder  -RB       Row Name 10/07/24 1028          Balance    Comment, Balance SBA->Min A for sitting balance at the EOB  -RB               User Key  (r) = Recorded By, (t) = Taken By, (c) = Cosigned By      Initials Name Provider Type    RB Justa Carranza OT Occupational Therapist                   Goals/Plan       Row Name 10/07/24 1029          Bed Mobility Goal 1 (OT)    Activity/Assistive Device (Bed Mobility Goal 1, OT) bed mobility activities, all  -RB     [place] Level/Cues Needed (Bed Mobility Goal 1, OT) standby assist  -RB     Time Frame (Bed Mobility Goal 1, OT) short term goal (STG);2 weeks  -RB      Progress/Outcomes (Bed Mobility Goal 1, OT) new goal  -RB       Row Name 10/07/24 1029          Transfer Goal 1 (OT)    Activity/Assistive Device (Transfer Goal 1, OT) transfers, all  -RB     [place] Level/Cues Needed (Transfer Goal 1, OT) standby assist  -RB     Time Frame (Transfer Goal 1, OT) short term goal (STG);2 weeks  -RB     Progress/Outcome (Transfer Goal 1, OT) new goal  -RB       Row Name 10/07/24 1029          Bathing Goal 1 (OT)    Activity/Device (Bathing Goal 1, OT) bathing skills, all  -RB     [place] Level/Cues Needed (Bathing Goal 1, OT) minimum assist (75% or more patient effort)  -RB     Time Frame (Bathing Goal 1, OT) short term goal (STG);2 weeks  -RB     Progress/Outcomes (Bathing Goal 1, OT) new goal  -RB       Row Name 10/07/24 1029          Dressing Goal 1 (OT)    Activity/Device (Dressing Goal 1, OT) dressing skills, all  -RB     [place]/Cues Needed (Dressing Goal 1, OT) minimum assist (75% or more patient effort)  -RB     Time Frame (Dressing Goal 1, OT) short term goal (STG);2 weeks  -RB     Progress/Outcome (Dressing Goal 1, OT) new goal  -RB       Row Name 10/07/24 1029          Toileting Goal 1 (OT)    Activity/Device (Toileting Goal 1, OT) toileting skills, all  -RB     [place] Level/Cues Needed (Toileting Goal 1, OT) minimum assist (75% or more patient effort)  -RB     Time Frame (Toileting Goal 1, OT) short term goal (STG);2 weeks  -RB     Progress/Outcome (Toileting Goal 1, OT) new goal  -RB       Row Name 10/07/24 1029          Grooming Goal 1 (OT)    Activity/Device (Grooming Goal 1, OT) grooming skills, all  -RB     [place] (Grooming Goal 1, OT) standby assist  -RB     Time Frame (Grooming Goal 1, OT) short term goal (STG);2 weeks  -RB     Progress/Outcome (Grooming Goal 1, OT) new goal  -RB       Row Name 10/07/24 1029          Self-Feeding Goal 1 (OT)    Activity/Device (Self-Feeding Goal 1, OT) self-feeding skills, all  -RB     [place]  Level/Cues Needed (Self-Feeding Goal 1, OT) standby assist  -RB     Time Frame (Self-Feeding Goal 1, OT) short term goal (STG);2 weeks  -RB     Progress/Outcomes (Self-Feeding Goal 1, OT) new goal  -RB       Row Name 10/07/24 1029          Therapy Assessment/Plan (OT)    Planned Therapy Interventions (OT) transfer/mobility retraining;strengthening exercise;activity tolerance training;BADL retraining;functional balance retraining;occupation/activity based interventions;patient/caregiver education/training  -RB               User Key  (r) = Recorded By, (t) = Taken By, (c) = Cosigned By      Initials Name Provider Type    RB Justa Carranza OT Occupational Therapist                   Clinical Impression       Row Name 10/07/24 1029          Pain Assessment    Pre/Posttreatment Pain Comment generalized pain - requested pain meds - RN notified  -RB     Pain Intervention(s) Repositioned;Cold pack;Elevated  -RB       Row Name 10/07/24 1029          Plan of Care Review    Plan of Care Reviewed With patient  -RB     Progress no change  -RB     Outcome Evaluation The pt was admitted to [facility] 2/2 to a L hip fx (s/p IMN pm 10/6, WBAT). Pmhx significant for  Parkinson's disease, coronary artery disease, type 2 diabetes with peripheral neuropathy. The pt resides at a nursing facility where he is provided assistance for ADL's. Today, Max A x2 provided for bed mobility. He sat a the EOB with SBA->Min A  for sitting balance while participating in UB/LB exercises and grooming tasks. Mod A x2 to stand and take several side steps along the EOB. He ended the session supine in bed. SNF recommended.  -RB       Row Name 10/07/24 1029          Therapy Assessment/Plan (OT)    Rehab Potential (OT) good, to achieve stated therapy goals  -RB     Criteria for Skilled Therapeutic Interventions Met (OT) yes;skilled treatment is necessary  -RB     Therapy Frequency (OT) 5 times/wk  -RB       Row Name 10/07/24 1020          Therapy Plan  Review/Discharge Plan (OT)    Anticipated Discharge Disposition (OT) skilled nursing facility  -RB       Row Name 10/07/24 1029          Positioning and Restraints    Pre-Treatment Position in bed  -RB     Post Treatment Position bed  -RB     In Bed notified nsg;supine;encouraged to call for assist;exit alarm on;patient within staff view;fowlers;SCD pump applied  -RB               User Key  (r) = Recorded By, (t) = Taken By, (c) = Cosigned By      Initials Name Provider Type    Justa Bowser OT Occupational Therapist                   Outcome Measures       Row Name 10/07/24 1030          How much help from another is currently needed...    Putting on and taking off regular lower body clothing? 1  -RB     Bathing (including washing, rinsing, and drying) 2  -RB     Toileting (which includes using toilet bed pan or urinal) 1  -RB     Putting on and taking off regular upper body clothing 2  -RB     Taking care of personal grooming (such as brushing teeth) 2  -RB     Eating meals 3  -RB     AM-PAC 6 Clicks Score (OT) 11  -RB       Row Name 10/07/24 1030          Modified Filer Scale    Modified Wil Scale 4 - Moderately severe disability.  Unable to walk without assistance, and unable to attend to own bodily needs without assistance.  -RB       Row Name 10/07/24 1030          Functional Assessment    Outcome Measure Options AM-PAC 6 Clicks Daily Activity (OT);Modified Wil  -RB               User Key  (r) = Recorded By, (t) = Taken By, (c) = Cosigned By      Initials Name Provider Type    Justa Bowser OT Occupational Therapist                    Occupational Therapy Education       Title: PT OT SLP Therapies (Not Started)       Topic: Occupational Therapy (Not Started)       Point: ADL training (Not Started)       Description:   Instruct learner(s) on proper safety adaptation and remediation techniques during self care or transfers.   Instruct in proper use of assistive devices.                   Learner Progress:  Not documented in this visit.              Point: Home exercise program (Not Started)       Description:   Instruct learner(s) on appropriate technique for monitoring, assisting and/or progressing therapeutic exercises/activities.                  Learner Progress:  Not documented in this visit.              Point: Precautions (Not Started)       Description:   Instruct learner(s) on prescribed precautions during self-care and functional transfers.                  Learner Progress:  Not documented in this visit.              Point: Body mechanics (Not Started)       Description:   Instruct learner(s) on proper positioning and spine alignment during self-care, functional mobility activities and/or exercises.                  Learner Progress:  Not documented in this visit.                                  OT Recommendation and Plan  Planned Therapy Interventions (OT): transfer/mobility retraining, strengthening exercise, activity tolerance training, BADL retraining, functional balance retraining, occupation/activity based interventions, patient/caregiver education/training  Therapy Frequency (OT): 5 times/wk  Plan of Care Review  Plan of Care Reviewed With: patient  Progress: no change  Outcome Evaluation: The pt was admitted to [facility] 2/2 to a L hip fx (s/p IMN pm 10/6, WBAT). Pmhx significant for  Parkinson's disease, coronary artery disease, type 2 diabetes with peripheral neuropathy. The pt resides at a nursing facility where he is provided assistance for ADL's. Today, Max A x2 provided for bed mobility. He sat a the EOB with SBA->Min A  for sitting balance while participating in UB/LB exercises and grooming tasks. Mod A x2 to stand and take several side steps along the EOB. He ended the session supine in bed. SNF recommended.     Time Calculation:   Evaluation Complexity (OT)  Review Occupational Profile/Medical/Therapy History Complexity: expanded/moderate complexity  Assessment, Occupational  Performance/Identification of Deficit Complexity: 5 or more performance deficits  Clinical Decision Making Complexity (OT): detailed assessment/moderate complexity  Overall Complexity of Evaluation (OT): moderate complexity     Time Calculation- OT       Row Name 10/07/24 1024             Time Calculation- OT    OT Start Time 0907  -RB      OT Stop Time 0930  -RB      OT Time Calculation (min) 23 min  -RB      Total Timed Code Minutes- OT 10 minute(s)  -RB      OT Received On 10/07/24  -RB      OT - Next Appointment 10/08/24  -RB      OT Goal Re-Cert Due Date 10/21/24  -RB         Timed Charges    31797 - OT Self Care/Mgmt Minutes 10  -RB         Untimed Charges    OT Eval/Re-eval Minutes 13  -RB         Total Minutes    Timed Charges Total Minutes 10  -RB      Untimed Charges Total Minutes 13  -RB       Total Minutes 23  -RB                User Key  (r) = Recorded By, (t) = Taken By, (c) = Cosigned By      Initials Name Provider Type    RB Justa Carranza OT Occupational Therapist                  Therapy Charges for Today       Code Description Service Date Service Provider Modifiers Qty    62931539556 HC OT SELF CARE/MGMT/TRAIN EA 15 MIN 10/7/2024 Justa Carranza OT GO 1    [id number] HC OT EVAL MOD COMPLEXITY 3 10/7/2024 Justa Carranza OT GO 1                 Justa Carranza OT  10/7/2024

## 2024-10-07 NOTE — PLAN OF CARE
Goal Outcome Evaluation:  Plan of Care Reviewed With: patient           Outcome Evaluation: Patient was admitted with L hip fracture and is s/p IM nail.  Patient is  WBAT.  Patient has history of Parkinson's disease and resides at a nursing facility.  Per chart and patient, he is able to walk short distances with a U step walker or uses a wheelchair.  Patient presents with pain, limited range of motion of LLE decreased functional mobility and difficulty walking.  Patient required mod to max assist x 2 for all mobility today.  He was able to come to standing twice and take a few sidesteps towards HOB.  Patient would benefit from PT to address his impairments.  Recommend SNF at [place].      Anticipated Discharge Disposition (PT): skilled nursing facility

## 2024-10-07 NOTE — PLAN OF CARE
[facility] (s/p IMN pm 10/6, WBAT). Pmhx significant for  Parkinson's disease, coronary artery disease, type 2 diabetes with peripheral neuropathy. The pt resides at a nursing facility where he is provided assistance for ADL's. Today, Max A x2 provided for bed mobility. He sat a the EOB with SBA->Min A  for sitting balance while participating in UB/LB exercises and grooming tasks. Mod A x2 to stand and take several side steps along the EOB. He ended the session supine in bed. SNF recommended.

## 2024-10-07 NOTE — CASE MANAGEMENT/SOCIAL WORK
Discharge Planning Assessment  [facility]     Patient Name: Deonte Harrell  MRN: [id number]  Today's Date: 10/7/2024    Admit Date: 10/4/2024    Plan: return to LT @ [facility]- will need transportation   Discharge Needs Assessment       Row Name 10/07/24 1114       Living Environment    People in Home facility resident    Name(s) of People in Home LTC resident @[facility]    Current Living Arrangements residential facility    Potentially Unsafe Housing Conditions none    Primary Care Provided by self    Provides Primary Care For no one, unable/limited ability to care for self    Family Caregiver if Needed spouse    Family Caregiver Names spouse, Samantha Wang [phone]    Quality of Family Relationships helpful;involved;supportive    Able to Return to Prior Arrangements yes       Resource/Environmental Concerns    Resource/Environmental Concerns none       Transition Planning    Patient/Family Anticipates Transition to long-term care facility    Patient/Family Anticipated Services at Transition        Discharge Needs Assessment    Equipment Currently Used at Home walker, standard                   Discharge Plan       Row Name 10/07/24 1111       Plan    Plan return to LTC @ [facility]- will need transportation    Patient/Family in Agreement with Plan yes    Plan Comments Called and spoke with wife, Samantha Harrell [phone]. Introduced self and explained role. Facesheet verified. Per wife, patient is a LTC resident @ [facility] in [place]. He has been there about 2 years and she plans for him to return at [place]. Called and spoke with [facility] who confirms they can accept back at [place]. Transfer packet in [facility] office and pharmacy updated. Will need transportation @ [place]. [facility] will follow.                  Continued Care and Services - Admitted Since 10/4/2024       Destination       Service Provider Request Status Selected Services Address Phone Fax Patient Preferred    [place]  [facility] Pending - Request Sent N/A [address] [place] [id number] [phone] --                     Demographic Summary       Row Name 10/07/24 1113       General Information    Admission Type inpatient    Arrived From emergency department    Required Notices Provided Important Message from Medicare    Referral Source admission list    Reason for Consult discharge planning    Preferred Language English                   Functional Status       Row Name 10/07/24 1113       Functional Status    Usual Activity Tolerance fair    Current Activity Tolerance fair       Functional Status, IADL    Medications completely dependent    Meal Preparation completely dependent    Housekeeping completely dependent    Laundry completely dependent    Shopping completely dependent    IADL Comments LT resident @ [place]       Mental Status Summary    Recent Changes in Mental Status/Cognitive Functioning no changes                   Psychosocial    No documentation.                  Abuse/Neglect    No documentation.                  Legal    No documentation.                  Substance Abuse    No documentation.                  Patient Forms    No documentation.                     Jammie Scott RN

## 2024-10-07 NOTE — THERAPY EVALUATION
Patient Name: Deonte Harrell  : 1950    MRN: [id number]                              Today's Date: 10/7/2024       Admit Date: 10/4/2024    Visit Dx:     ICD-10-CM ICD-9-CM   1. Closed fracture of left hip, initial encounter  S72.002A 820.8     Patient Active Problem List   Diagnosis    Hip fracture    Coronary atherosclerosis of native coronary artery    PVD (peripheral vascular disease)    Parkinson disease    T2DM (type 2 diabetes mellitus)     Past Medical History:   Diagnosis Date    CHF (congestive heart failure)     Diabetes mellitus     Hypertension      Past Surgical History:   Procedure Laterality Date    FEMUR IM NAILING/RODDING Left 10/6/2024    Procedure: FEMUR INTRAMEDULLARY NAILING/RODDING;  Surgeon: Gene Norris MD;  Location: [facility];  Service: Orthopedics;  Laterality: Left;      General Information       Row Name 10/07/24 1141          Physical Therapy Time and Intention    Document Type therapy note (daily note);evaluation  -     Mode of Treatment co-treatment;occupational therapy;physical therapy;other (see comments)  -       Row Name 10/07/24 1141          General Information    Patient Profile Reviewed yes  -     Prior Level of Function min assist:;transfer;gait  Short distances with rolling walker, or wheelchair  -     Existing Precautions/Restrictions fall  -     Barriers to Rehab previous functional deficit;cognitive status;medically complex  -       Row Name 10/07/24 1141          Living Environment    People in Home facility resident  -       Row Name 10/07/24 1141          Safety Issues, Functional Mobility    Safety Issues Affecting Function (Mobility) awareness of need for assistance;insight into deficits/self-awareness  -     Impairments Affecting Function (Mobility) balance;endurance/activity tolerance;pain;range of motion (ROM);strength  -     Comment, Safety Issues/Impairments (Mobility) Co treatment medically appropriate and necessary  due to patient acuity level, activity tolerance and safety of patient and staff. Treatment is focusing on progression of care and goals established in the POC.  -               User Key  (r) = Recorded By, (t) = Taken By, (c) = Cosigned By      Initials Name Provider Type    Una Garza, PT Physical Therapist                   Mobility       Row Name 10/07/24 1143          Bed Mobility    Bed Mobility supine-sit;sit-supine  -     Supine-Sit [place] (Bed Mobility) maximum assist (25% patient effort);2 person assist;verbal cues  -     Sit-Supine [place] (Bed Mobility) maximum assist (25% patient effort);2 person assist;verbal cues  -     Assistive Device (Bed Mobility) bed rails  -       Row Name 10/07/24 1143          Transfers    Comment, (Transfers) STS x 2, verbal cues for upright posture and knee extension  -       Row Name 10/07/24 1143          Sit-Stand Transfer    Sit-Stand [place] (Transfers) 2 person assist;verbal cues;moderate assist (50% patient effort)  -     Assistive Device (Sit-Stand Transfers) walker, front-wheeled  -       Row Name 10/07/24 1143          Gait/Stairs (Locomotion)    [place] Level (Gait) moderate assist (50% patient effort);maximum assist (25% patient effort);2 person assist  -     Assistive Device (Gait) walker, front-wheeled  -     Distance in Feet (Gait) --  2 sidesteps towards head of bed  -     Comment, (Gait/Stairs) Knee buckling with sidesteps  -               User Key  (r) = Recorded By, (t) = Taken By, (c) = Cosigned By      Initials Name Provider Type    Una Garza PT Physical Therapist                   Obj/Interventions       Row Name 10/07/24 1143          Range of Motion Comprehensive    Comment, General Range of Motion Left lower extremity limited 25% secondary to pain  -       Row Name 10/07/24 1143          Strength Comprehensive (MMT)    Comment, General Manual Muscle Testing (MMT)  Assessment Expected postop weakness  -       Row Name 10/07/24 1143          Motor Skills    Therapeutic Exercise --  Left hip protocol x 10 reps with assist  -       Row Name 10/07/24 1143          Balance    Balance Assessment sitting static balance;standing static balance  -     Static Sitting Balance minimal assist;contact guard  -     Position, Sitting Balance sitting edge of bed  -     Static Standing Balance moderate assist;2-person assist  -     Position/Device Used, Standing Balance walker, rolling  -               User Key  (r) = Recorded By, (t) = Taken By, (c) = Cosigned By      Initials Name Provider Type    Una Garza, PT Physical Therapist                   Goals/Plan       Row Name 10/07/24 1148          Bed Mobility Goal 1 (PT)    Activity/Assistive Device (Bed Mobility Goal 1, PT) sit to supine/supine to sit  -KH     [place] Level/Cues Needed (Bed Mobility Goal 1, PT) minimum assist (75% or more patient effort)  -KH     Time Frame (Bed Mobility Goal 1, PT) long term goal (LTG);1 week  -       Row Name 10/07/24 1148          Transfer Goal 1 (PT)    Activity/Assistive Device (Transfer Goal 1, PT) sit-to-stand/stand-to-sit  -KH     [place] Level/Cues Needed (Transfer Goal 1, PT) minimum assist (75% or more patient effort)  -KH     Time Frame (Transfer Goal 1, PT) long term goal (LTG);1 week  -       Row Name 10/07/24 1148          Gait Training Goal 1 (PT)    Activity/Assistive Device (Gait Training Goal 1, PT) gait (walking locomotion)  -KH     [place] Level (Gait Training Goal 1, PT) minimum assist (75% or more patient effort)  -KH     Distance (Gait Training Goal 1, PT) 30  -KH     Time Frame (Gait Training Goal 1, PT) long term goal (LTG);1 week  -       Row Name 10/07/24 1148          Patient Education Goal (PT)    Activity (Patient Education Goal, PT) HEP  -KH     [place]/Cues/Accuracy (Memory Goal 2, PT) demonstrates adequately  -KH      Time Frame (Patient Education Goal, PT) 1 week  -       Row Name 10/07/24 1148          Therapy Assessment/Plan (PT)    Planned Therapy Interventions (PT) balance training;bed mobility training;gait training;home exercise program;ROM (range of motion);patient/family education;stair training;strengthening;stretching;transfer training  -               User Key  (r) = Recorded By, (t) = Taken By, (c) = Cosigned By      Initials Name Provider Type     Una Don, PT Physical Therapist                   Clinical Impression       Row Name 10/07/24 1145          Pain    Pain Location - hip  -     Pain Intervention(s) Repositioned;Rest;Cold applied  -       Row Name 10/07/24 1145          Plan of Care Review    Plan of Care Reviewed With patient  -     Outcome Evaluation Patient was admitted with L hip fracture and is s/p IM nail.  Patient is  WBAT.  Patient has history of Parkinson's disease and resides at a nursing facility.  Per chart and patient, he is able to walk short distances with a U step walker or uses a wheelchair.  Patient presents with pain, limited range of motion of LLE decreased functional mobility and difficulty walking.  Patient required mod to max assist x 2 for all mobility today.  He was able to come to standing twice and take a few sidesteps towards HOB.  Patient would benefit from PT to address his impairments.  Recommend SNF at [place].  -       Row Name 10/07/24 1145          Therapy Assessment/Plan (PT)    Patient/Family Therapy Goals Statement (PT) Not specified  -     Rehab Potential (PT) good, to achieve stated therapy goals  -     Criteria for Skilled Interventions Met (PT) yes  -     Therapy Frequency (PT) 6 times/wk  -       Row Name 10/07/24 1145          Positioning and Restraints    Pre-Treatment Position in bed  -     Post Treatment Position bed  -     In Bed fowlers;call light within reach;encouraged to call for assist;exit alarm on;notified nsg;with  nsg  N/A in room  -               User Key  (r) = Recorded By, (t) = Taken By, (c) = Cosigned By      Initials Name Provider Type    Una Garza, PT Physical Therapist                   Outcome Measures       Row Name 10/07/24 1149          How much help from another person do you currently need...    Turning from your back to your side while in flat bed without using bedrails? 2  -KH     Moving from lying on back to sitting on the side of a flat bed without bedrails? 2  -KH     Moving to and from a bed to a chair (including a wheelchair)? 2  -KH     Standing up from a chair using your arms (e.g., wheelchair, bedside chair)? 2  -KH     Climbing 3-5 steps with a railing? 1  -KH     To walk in hospital room? 1  -KH     AM-PAC 6 Clicks Score (PT) 10  -KH     Highest Level of Mobility Goal 4 --> Transfer to chair/commode  -KH       Row Name 10/07/24 1030          Modified Dodge Scale    Modified Dodge Scale 4 - Moderately severe disability.  Unable to walk without assistance, and unable to attend to own bodily needs without assistance.  -RB       Row Name 10/07/24 1149 10/07/24 1030       Functional Assessment    Outcome Measure Options AM-PAC 6 Clicks Basic Mobility (PT)  - AM-PAC 6 Clicks Daily Activity (OT);Modified Wil  -RB              User Key  (r) = Recorded By, (t) = Taken By, (c) = Cosigned By      Initials Name Provider Type    Una Garza, PT Physical Therapist    RB Justa Carranza, OT Occupational Therapist                                 Physical Therapy Education       Title: PT OT SLP Therapies (Not Started)       Topic: Physical Therapy (Not Started)       Point: Mobility training (Not Started)       Learner Progress:  Not documented in this visit.              Point: Home exercise program (Not Started)       Learner Progress:  Not documented in this visit.              Point: Body mechanics (Not Started)       Learner Progress:  Not documented in this visit.               Point: Precautions (Not Started)       Learner Progress:  Not documented in this visit.                                  PT Recommendation and Plan  Planned Therapy Interventions (PT): balance training, bed mobility training, gait training, home exercise program, ROM (range of motion), patient/family education, stair training, strengthening, stretching, transfer training  Plan of Care Reviewed With: patient  Outcome Evaluation: Patient was admitted with L hip fracture and is s/p IM nail.  Patient is  WBAT.  Patient has history of Parkinson's disease and resides at a nursing facility.  Per chart and patient, he is able to walk short distances with a U step walker or uses a wheelchair.  Patient presents with pain, limited range of motion of LLE decreased functional mobility and difficulty walking.  Patient required mod to max assist x 2 for all mobility today.  He was able to come to standing twice and take a few sidesteps towards HOB.  Patient would benefit from PT to address his impairments.  Recommend SNF at [place].     Time Calculation:         PT Charges       Row Name 10/07/24 1150             Time Calculation    Start Time 0907  -      Stop Time 0930  -      Time Calculation (min) 23 min  -KH      PT Received On 10/07/24  -      PT - Next Appointment 10/08/24  -      PT Goal Re-Cert Due Date 10/14/24  -         Time Calculation- PT    Total Timed Code Minutes- PT 12 minute(s)  -KH         Timed Charges    82361 - PT Therapeutic Exercise Minutes 10  -KH         Total Minutes    Timed Charges Total Minutes 10  -KH       Total Minutes 10  -KH                User Key  (r) = Recorded By, (t) = Taken By, (c) = Cosigned By      Initials Name Provider Type     Una Don, PT Physical Therapist                  Therapy Charges for Today       Code Description Service Date Service Provider Modifiers Qty    [id number]  PT THER PROC EA 15 MIN 10/7/2024 Una Don, PT GP 1     [id number]  PT EVAL MOD COMPLEXITY 2 10/7/2024 Una Don, PT GP 1            PT G-Codes  Outcome Measure Options: AM-PAC 6 Clicks Basic Mobility (PT)  AM-PAC 6 Clicks Score (PT): 10  AM-PAC 6 Clicks Score (OT): 11  Modified [place] Scale: 4 - Moderately severe disability.  Unable to walk without assistance, and unable to attend to own bodily needs without assistance.  PT Discharge Summary  Anticipated Discharge Disposition (PT): skilled nursing facility    Una Don, PT  10/7/2024

## 2024-10-07 NOTE — DISCHARGE PLACEMENT REQUEST
"Luc Harrell (74 y.o. Male)       Date of Birth   1950    Social Security Number       Address   [address] [place] [address] [place] [facility]    Home Phone   [phone]    MRN   [id number]       Baptism   None    Marital Status                               Admission Date   10/4/24    Admission Type   Urgent    Admitting Provider   Adelso Kohler MD    Attending Provider   Gucci Stafford MD    Department, Room/Bed   [address], P785/1       Discharge Date       Discharge Disposition       Discharge Destination                                 Attending Provider: Gucci Stafford MD    Allergies: Clonazepam, Entacapone, Isosorbide Mononitrate [Isosorbide Nitrate], Plecanatide    Isolation: None   Infection: None   Code Status: CPR    Ht: 185.4 cm (73\")   Wt: 95.7 kg (211 lb)    Admission Cmt: None   Principal Problem: Hip fracture [S72.009A]                   Active Insurance as of 10/4/2024       Primary Coverage       Payor Plan Insurance Group Employer/Plan Group    MEDICARE MEDICARE A & B        Payor Plan Address Payor Plan Phone Number Payor Plan Fax Number Effective Dates    [address] [phone]  4/1/2013 - None Entered    [facility] [id number]         Subscriber Name Subscriber Birth Date Member ID       LUC HARRELL 1950 [id number]               Secondary Coverage       Payor Plan Insurance Group Employer/Plan Group    [facility] OPTUM        Payor Plan Address Payor Plan Phone Number Payor Plan Fax Number Effective Dates    [address] [phone]  6/27/2022 - None Entered    [facility] [id number]         Subscriber Name Subscriber Birth Date Member ID       LUC HARRELL 1950 [id number]                     Emergency Contacts        (Rel.) Home Phone Work Phone Mobile Phone    Julio CesarSamantha (Spouse) [phone] -- --                "

## 2024-10-07 NOTE — PROGRESS NOTES
Name: Deonte Harrell ADMIT: 10/4/2024   : 1950  PCP: Provider, No Known    MRN: [id number] LOS: 3 days   AGE/SEX: 74 y.o. male  ROOM: [facility]   Subjective   No chief complaint on file.  Cc- hip pain    Surgery yesterday  Pain is fair  Had catheter prior to admission for retention  On 2L oxygen       Objective   Vital Signs  Temp:  [97.7 °F (36.5 °C)-99.1 °F (37.3 °C)] 98.1 °F (36.7 °C)  Heart Rate:  [53-63] 60  Resp:  [16-22] 16  BP: (108-130)/(57-70) 115/62  SpO2:  [90 %-97 %] 95 %  on  Flow (L/min):  [2] 2;   Device (Oxygen Therapy): room air  Body mass index is 27.84 kg/m².    Physical Exam  HENT:      Head: Normocephalic and atraumatic.   Eyes:      General: No scleral icterus.  Cardiovascular:      Rate and Rhythm: Normal rate and regular rhythm.      Heart sounds: Normal heart sounds.   Pulmonary:      Effort: Pulmonary effort is normal. No respiratory distress.      Breath sounds: Normal breath sounds.   Abdominal:      General: There is no distension.      Palpations: Abdomen is soft.      Tenderness: There is no abdominal tenderness.   Musculoskeletal:      Cervical back: Neck supple.     Elderly, chronically ill  Awake, poor memory  Post op changes    Results Review:       I reviewed the patient's new clinical results.  Results from last 7 days   Lab Units 10/07/24  0506 10/06/24  0345 10/05/24  0416   WBC 10*3/mm3 9.22 8.79 8.34   HEMOGLOBIN g/dL 10.5* 11.0* 11.9*   PLATELETS 10*3/mm3 188 169 178     Results from last 7 days   Lab Units 10/07/24  0506 10/06/24  0345 10/05/24  0416   SODIUM mmol/L 142 143 139   POTASSIUM mmol/L 4.4 4.3 3.8   CHLORIDE mmol/L 104 106 101   CO2 mmol/L 29.0 31.4* 27.3   BUN mg/dL 20 14 11   CREATININE mg/dL 0.69* 0.91 0.63*   GLUCOSE mg/dL 169* 137* 160*   Estimated Creatinine Clearance: 131 mL/min (A) (by C-G formula based on SCr of 0.69 mg/dL (L)).  Results from last 7 days   Lab Units 10/05/24  0416   ALBUMIN g/dL 3.6   BILIRUBIN mg/dL 0.8   ALK PHOS U/L 93  "  AST (SGOT) U/L 12   ALT (SGPT) U/L 11     Results from last 7 days   Lab Units 10/07/24  0506 10/06/24  0345 10/05/24  0416   CALCIUM mg/dL 8.6 8.9 8.5*   ALBUMIN g/dL  --   --  3.6   MAGNESIUM mg/dL  --   --  1.7   PHOSPHORUS mg/dL  --   --  2.9         Coag     HbA1C   Lab Results   Component Value Date    HGBA1C 6.70 (H) 10/05/2024     Infection     Radiology(recent) No radiology results for the last day  No results found for: \"TROPONINT\", \"TROPONINI\", \"BNP\"  No components found for: \"TSH;2\"    amantadine, 100 mg, Oral, Q12H  apixaban, 2.5 mg, Oral, Q12H  carbidopa-levodopa, 1 tablet, Oral, TID  cholecalciferol, 1,000 Units, Oral, Daily  insulin lispro, 2-7 Units, Subcutaneous, 4x Daily AC & at Bedtime  ipratropium-albuterol, 3 mL, Nebulization, 4x Daily - RT  lansoprazole, 30 mg, Oral, Q AM  lisinopril, 5 mg, Oral, Daily  melatonin, 5 mg, Oral, Nightly  metFORMIN, 1,000 mg, Oral, BID With Meals  metoprolol tartrate, 50 mg, Oral, BID  polyethylene glycol, 17 g, Oral, BID  potassium chloride, 10 mEq, Oral, Daily  QUEtiapine, 25 mg, Oral, Nightly  rOPINIRole, 2 mg, Oral, Nightly  sennosides-docusate, 1 tablet, Oral, BID  sodium chloride, 10 mL, Intravenous, Q12H         Diet: Regular/House, Diabetic; Consistent Carbohydrate; Fluid Consistency: Thin (IDDSI 0)      Assessment & Plan      Active Hospital Problems    Diagnosis  POA    **Hip fracture [S72.009A]  Yes    Coronary atherosclerosis of native coronary artery [I25.10]  Unknown    PVD (peripheral vascular disease) [I73.9]  Unknown    Parkinson disease [G20.A1]  Unknown    T2DM (type 2 diabetes mellitus) [E11.9]  Unknown      Resolved Hospital Problems   No resolved problems to display.       Left hip fracture  Orthopedic surgery has seen and s/p OR on 10/6  prn agents for pain  Eliquis for dvt proph per Orthopedics     Dysarthria  Parkinson's disease  Likely a complication of Parkinson's disorder  Resumed home parkinsons medications       T2DM  SSI  Resume " metformin      Urinary retention  Had louise prior to admission     VTE Prophylaxis - SCDs. eliquis      DW staff    Dispo- likely SNF on Tuesday  Gucci Stafford MD  [facility]  10/07/24  14:37 EDT   paresthesias

## 2024-10-07 NOTE — PROGRESS NOTES
Procedure(s):  FEMUR INTRAMEDULLARY NAILING/RODDING     LOS: 3 days     Subjective :   he is resting comfortably in bed    Objective :    Vital signs in last 24 hours:  Vitals:    10/07/24 0200 10/07/24 0202 10/07/24 0624 10/07/24 0648   BP: 114/59 114/59 110/57    BP Location: Right arm Right arm Right arm    Patient Position: Lying Lying Lying    Pulse: 53 56 54 57   Resp: 16 16 16 16   Temp:  98.2 °F (36.8 °C) 97.7 °F (36.5 °C)    TempSrc: Oral Oral Oral    SpO2: 95% 97% 95% 92%   Weight:       Height:           PHYSICAL EXAM:  Patient is calm, in no acute distress,   Dressing is clean, dry and intact.  No signs of infection.  Swelling is appropriate in amount.  Ecchymosis is appropriate in amount.  Homans test is negative.  Brisk capillary refill    LABS:  Results from last 7 days   Lab Units 10/07/24  0506   WBC 10*3/mm3 9.22   HEMOGLOBIN g/dL 10.5*   HEMATOCRIT % 32.5*   PLATELETS 10*3/mm3 188     Results from last 7 days   Lab Units 10/07/24  0506   SODIUM mmol/L 142   POTASSIUM mmol/L 4.4   CHLORIDE mmol/L 104   CO2 mmol/L 29.0   BUN mg/dL 20   CREATININE mg/dL 0.69*   GLUCOSE mg/dL 169*   CALCIUM mg/dL 8.6             ASSESSMENT:  Status post Procedure(s):  FEMUR INTRAMEDULLARY NAILING/RODDING      Plan:  Continue Physical Therapy, increase mobility and range of motion as tolerated.  Continue SCDs, Continue DVT prophylaxis.  Eliquis 2.5 mg twice a day for 35 days for DVT prophylaxis  Dispo planning for placement  He can follow-up in 2 weeks in the office.  They should call [phone] for appointment  Orthopedics will sign off.  Call with questions.  Thank you.    Gene Norris MD    Date: 10/7/2024  Time: 07:22 EDT

## 2024-10-08 VITALS
RESPIRATION RATE: 18 BRPM | WEIGHT: 211 LBS | OXYGEN SATURATION: 96 % | DIASTOLIC BLOOD PRESSURE: 78 MMHG | HEART RATE: 64 BPM | BODY MASS INDEX: 27.96 KG/M2 | HEIGHT: 73 IN | SYSTOLIC BLOOD PRESSURE: 131 MMHG | TEMPERATURE: 98.6 F

## 2024-10-08 PROBLEM — R33.9 URINARY RETENTION: Status: ACTIVE | Noted: 2024-10-08

## 2024-10-08 LAB
ANION GAP SERPL CALCULATED.3IONS-SCNC: 7 MMOL/L (ref 5–15)
BUN SERPL-MCNC: 20 MG/DL (ref 8–23)
BUN/CREAT SERPL: 28.6 (ref 7–25)
CALCIUM SPEC-SCNC: 8.3 MG/DL (ref 8.6–10.5)
CHLORIDE SERPL-SCNC: 107 MMOL/L (ref 98–107)
CO2 SERPL-SCNC: 30 MMOL/L (ref 22–29)
CREAT SERPL-MCNC: 0.7 MG/DL (ref 0.76–1.27)
DEPRECATED RDW RBC AUTO: 42.8 FL (ref 37–54)
EGFRCR SERPLBLD CKD-EPI 2021: 97.9 ML/MIN/1.73
ERYTHROCYTE [DISTWIDTH] IN BLOOD BY AUTOMATED COUNT: 11.8 % (ref 12.3–15.4)
GLUCOSE BLDC GLUCOMTR-MCNC: 141 MG/DL (ref 70–130)
GLUCOSE SERPL-MCNC: 150 MG/DL (ref 65–99)
HCT VFR BLD AUTO: 29.5 % (ref 37.5–51)
HGB BLD-MCNC: 9.6 G/DL (ref 13–17.7)
MCH RBC QN AUTO: 32.3 PG (ref 26.6–33)
MCHC RBC AUTO-ENTMCNC: 32.5 G/DL (ref 31.5–35.7)
MCV RBC AUTO: 99.3 FL (ref 79–97)
PLATELET # BLD AUTO: 183 10*3/MM3 (ref 140–450)
PMV BLD AUTO: 10.8 FL (ref 6–12)
POTASSIUM SERPL-SCNC: 3.9 MMOL/L (ref 3.5–5.2)
RBC # BLD AUTO: 2.97 10*6/MM3 (ref 4.14–5.8)
SODIUM SERPL-SCNC: 144 MMOL/L (ref 136–145)
WBC NRBC COR # BLD AUTO: 7.49 10*3/MM3 (ref 3.4–10.8)

## 2024-10-08 PROCEDURE — 94664 DEMO&/EVAL PT USE INHALER: CPT

## 2024-10-08 PROCEDURE — 80048 BASIC METABOLIC PNL TOTAL CA: CPT | Performed by: ORTHOPAEDIC SURGERY

## 2024-10-08 PROCEDURE — 82948 REAGENT STRIP/BLOOD GLUCOSE: CPT

## 2024-10-08 PROCEDURE — 94799 UNLISTED PULMONARY SVC/PX: CPT

## 2024-10-08 PROCEDURE — 94761 N-INVAS EAR/PLS OXIMETRY MLT: CPT

## 2024-10-08 PROCEDURE — 85027 COMPLETE CBC AUTOMATED: CPT | Performed by: ORTHOPAEDIC SURGERY

## 2024-10-08 RX ORDER — POLYETHYLENE GLYCOL 3350 17 G/17G
17 POWDER, FOR SOLUTION ORAL 2 TIMES DAILY
Start: 2024-10-08

## 2024-10-08 RX ORDER — AMOXICILLIN 250 MG
2 CAPSULE ORAL 2 TIMES DAILY
Start: 2024-10-08

## 2024-10-08 RX ORDER — HYDROCODONE BITARTRATE AND ACETAMINOPHEN 5; 325 MG/1; MG/1
1 TABLET ORAL EVERY 6 HOURS PRN
Qty: 10 TABLET | Refills: 0 | Status: SHIPPED | OUTPATIENT
Start: 2024-10-08

## 2024-10-08 RX ADMIN — IPRATROPIUM BROMIDE AND ALBUTEROL SULFATE 3 ML: .5; 3 SOLUTION RESPIRATORY (INHALATION) at 07:14

## 2024-10-08 RX ADMIN — Medication 1000 UNITS: at 09:05

## 2024-10-08 RX ADMIN — APIXABAN 2.5 MG: 2.5 TABLET, FILM COATED ORAL at 09:05

## 2024-10-08 RX ADMIN — CARBIDOPA AND LEVODOPA 1 TABLET: 25; 100 TABLET ORAL at 09:05

## 2024-10-08 RX ADMIN — METOPROLOL TARTRATE 50 MG: 50 TABLET, FILM COATED ORAL at 09:05

## 2024-10-08 RX ADMIN — LANSOPRAZOLE 30 MG: 15 TABLET, ORALLY DISINTEGRATING ORAL at 05:27

## 2024-10-08 RX ADMIN — POTASSIUM CHLORIDE 10 MEQ: 750 TABLET, EXTENDED RELEASE ORAL at 09:05

## 2024-10-08 RX ADMIN — AMANTADINE HYDROCHLORIDE 100 MG: 100 CAPSULE ORAL at 09:06

## 2024-10-08 RX ADMIN — METFORMIN HYDROCHLORIDE 1000 MG: 1000 TABLET, FILM COATED ORAL at 09:05

## 2024-10-08 RX ADMIN — POLYETHYLENE GLYCOL 3350 17 G: 17 POWDER, FOR SOLUTION ORAL at 09:05

## 2024-10-08 RX ADMIN — Medication 10 ML: at 09:06

## 2024-10-08 RX ADMIN — SENNOSIDES AND DOCUSATE SODIUM 1 TABLET: 50; 8.6 TABLET ORAL at 09:05

## 2024-10-08 RX ADMIN — LISINOPRIL 5 MG: 5 TABLET ORAL at 09:06

## 2024-10-08 NOTE — DISCHARGE SUMMARY
NAME: Deonte Harrell ADMIT: 10/4/2024   : 1950  PCP: Provider, No Known    MRN: [id number] LOS: 4 days   AGE/SEX: 74 y.o. male  ROOM: P785/1     Date of Admission:  10/4/2024  Date of Discharge:  10/8/2024    PCP: Provider, No Known    CHIEF COMPLAINT  No chief complaint on file.      DISCHARGE DIAGNOSIS  Active Hospital Problems    Diagnosis  POA    **Hip fracture [S72.009A]  Yes    Urinary retention [R33.9]  Yes    Coronary atherosclerosis of native coronary artery [I25.10]  Yes    PVD (peripheral vascular disease) [I73.9]  Yes    Parkinson disease [G20.A1]  Yes    T2DM (type 2 diabetes mellitus) [E11.9]  Yes      Resolved Hospital Problems   No resolved problems to display.       SECONDARY DIAGNOSES  Past Medical History:   Diagnosis Date    CHF (congestive heart failure)     Diabetes mellitus     Hypertension        CONSULTS       HOSPITAL COURSE  This is a 74-year-old male with a past medical history of Parkinson's disease, coronary artery disease, type 2 diabetes with peripheral neuropathy, BPH with chronic catheter prior to admission to [facility], peripheral vascular disease, type 2 diabetes who presents the hospital after experiencing a fracture of the left hip.      He underwent FEMUR INTRAMEDULLARY NAILING/RODDING with Dr. Norris on 10/6. Post op he has done will. Per Orthopedics Eliquis 2.5 mg twice a day for 35 days for DVT prophylaxis. He can follow-up in 2 weeks in the office.  They should call [phone] for appointment    He will be discharge to subacute rehab today with outpatient follow up.    DIAGNOSTICS    Collected Updated Procedure Result Status    10/08/2024 0422 10/08/2024 0531 CBC (No Diff) [[id number]]   (Abnormal)   Blood    Final result Component Value Units   WBC 7.49 10*3/mm3   RBC 2.97 Low  10*6/mm3   Hemoglobin 9.6 Low  g/dL   Hematocrit 29.5 Low  %   MCV 99.3 High  fL   MCH 32.3 pg   MCHC 32.5 g/dL   RDW 11.8 Low  %   RDW-SD 42.8 fl   MPV 10.8 fL   Platelets 183  10*3/mm3          10/08/2024 0422 10/08/2024 0544 Basic Metabolic Panel [[id number]]    (Abnormal)   Blood    Final result Component Value Units   Glucose 150 High  mg/dL   BUN 20 mg/dL   Creatinine 0.70 Low  mg/dL   Sodium 144 mmol/L   Potassium 3.9 mmol/L   Chloride 107 mmol/L   CO2 30.0 High  mmol/L   Calcium 8.3 Low  mg/dL   BUN/Creatinine Ratio 28.6 High     Anion Gap 7.0 mmol/L   eGFR 97.9 mL/min/1.73               10/05/2024 0416 10/05/2024 0457 Hemoglobin A1c [[id number]]    (Abnormal)   Blood    Final result Component Value Units   Hemoglobin A1C 6.70 High  %                  XR Pelvis 1 or 2 View [[id number]] Skip as Reviewed   Order Status: Completed Collected: 10/05/24 0656    Updated: 10/05/24 0703   Narrative:     XR FEMUR 2 VW LEFT-, XR PELVIS 1 OR 2 VW-     INDICATIONS: Fracture.     TECHNIQUE: 5 VIEWS OF THE LEFT FEMUR, frontal view of the pelvis     COMPARISON: None available     FINDINGS:     Comminuted left intertrochanteric fracture is noted. No other fractures  are identified. No dislocation. Hip joint spaces appear preserved.  Arterial calcifications are present.      Impression:        Left intertrochanteric fracture.           This report was finalized on 10/5/2024 7:00 AM by Dr. Jean Paul Lanier M.D on Workstation: [id number]       XR Femur 2 View Left [[id number]] Sikp as Reviewed   Order Status: Completed Collected: 10/05/24 0656    Updated: 10/05/24 0703   Narrative:     XR FEMUR 2 VW LEFT-, XR PELVIS 1 OR 2 VW-     INDICATIONS: Fracture.     TECHNIQUE: 5 VIEWS OF THE LEFT FEMUR, frontal view of the pelvis     COMPARISON: None available     FINDINGS:     Comminuted left intertrochanteric fracture is noted. No other fractures  are identified. No dislocation. Hip joint spaces appear preserved.  Arterial calcifications are present.      Impression:        Left intertrochanteric fracture.             PHYSICAL EXAM  Objective:  Vital signs: (most recent): Blood pressure 114/69, pulse 57,  "temperature 97.3 °F (36.3 °C), temperature source Oral, resp. rate 16, height 185.4 cm (73\"), weight 95.7 kg (211 lb), SpO2 94%.                Chronically ill  Poor memory  No resp distress    CONDITION ON DISCHARGE  Stable.      DISCHARGE DISPOSITION   Skilled Nursing Facility (DC - External)      DISCHARGE MEDICATIONS       Your medication list        START taking these medications        Instructions Last Dose Given Next Dose Due   apixaban 2.5 MG tablet tablet  Commonly known as: ELIQUIS      Take 1 tablet by mouth Every 12 (Twelve) Hours for 67 doses. Indications: Post Surgical - Hip       HYDROcodone-acetaminophen 5-325 MG per tablet  Commonly known as: Norco      Take 1 tablet by mouth Every 6 (Six) Hours As Needed for Moderate Pain.              CHANGE how you take these medications        Instructions Last Dose Given Next Dose Due   metFORMIN 1000 MG tablet  Commonly known as: GLUCOPHAGE  What changed: Another medication with the same name was removed. Continue taking this medication, and follow the directions you see here.      Take 1 tablet by mouth 2 (Two) Times a Day With Meals.       omeprazole 20 MG capsule  Commonly known as: priLOSEC  What changed: Another medication with the same name was removed. Continue taking this medication, and follow the directions you see here.      Take 1 capsule by mouth Daily.       polyethylene glycol 17 g packet  Commonly known as: MIRALAX  What changed:   when to take this  reasons to take this      Take 17 g by mouth 2 (Two) Times a Day.       sennosides-docusate 8.6-50 MG per tablet  Commonly known as: PERICOLACE  What changed: You were already taking a medication with the same name, and this prescription was added. Make sure you understand how and when to take each.      Take 2 tablets by mouth 2 (Two) Times a Day.       sennosides-docusate 8.6-50 MG per tablet  Commonly known as: PERICOLACE  What changed: Another medication with the same name was added. Make sure " you understand how and when to take each.      Take 1 tablet by mouth 2 (Two) Times a Day.              CONTINUE taking these medications        Instructions Last Dose Given Next Dose Due   acetaminophen 500 MG tablet  Commonly known as: TYLENOL      Take 2 tablets by mouth 3 (Three) Times a Day As Needed for Mild Pain. [facility]       amantadine 100 MG tablet  Commonly known as: SYMMETREL      Take 1 tablet by mouth 2 (Two) Times a Day.       aspirin 81 MG EC tablet      Take 1 tablet by mouth Daily.       atorvastatin 80 MG tablet  Commonly known as: LIPITOR      Take 1 tablet by mouth Daily.       bisacodyl 5 MG EC tablet  Commonly known as: DULCOLAX      Insert 1 tablet into the rectum Daily As Needed.       carbidopa-levodopa  MG per tablet  Commonly known as: SINEMET      Take 1 tablet by mouth 3 (Three) Times a Day.       cholecalciferol 10 MCG (400 UNIT) tablet  Commonly known as: VITAMIN D3      Take  by mouth Daily.       cyanocobalamin 500 MCG tablet  Commonly known as: VITAMIN B-12      Take 2 tablets by mouth Daily.       furosemide 40 MG tablet  Commonly known as: LASIX      Take 1 tablet by mouth Daily.       glipizide 5 MG tablet  Commonly known as: GLUCOTROL      Take 1 tablet by mouth 2 (Two) Times a Day Before Meals.       ipratropium-albuterol 0.5-2.5 mg/3 ml nebulizer  Commonly known as: DUO-NEB      Take 3 mL by nebulization 4 (Four) Times a Day.       isosorbide mononitrate 30 MG 24 hr tablet  Commonly known as: IMDUR      Take 1 tablet by mouth Daily.       lisinopril 5 MG tablet  Commonly known as: PRINIVIL,ZESTRIL      Take 1 tablet by mouth Daily.       loperamide 2 MG tablet  Commonly known as: IMODIUM A-D      Take 1 tablet by mouth 4 (Four) Times a Day As Needed for Diarrhea.       magnesium hydroxide 400 MG/5ML suspension  Commonly known as: MILK OF MAGNESIA      Take 30 mL by mouth Daily As Needed for Constipation.       melatonin 5 MG tablet tablet      Take 1 tablet by  mouth Every Night.       metoprolol tartrate 50 MG tablet  Commonly known as: LOPRESSOR      Take 1 tablet by mouth 2 (Two) Times a Day.       potassium chloride 10 MEQ CR capsule  Commonly known as: MICRO-K      Take 1 capsule by mouth Daily.       potassium chloride 10 MEQ CR tablet  Commonly known as: KLOR-CON M10      Take 1 tablet by mouth Daily.       promethazine 25 MG tablet  Commonly known as: PHENERGAN      Take 0.5 tablets by mouth Every 6 (Six) Hours As Needed for Nausea or Vomiting.       QUEtiapine 25 MG tablet  Commonly known as: SEROquel      Take 1 tablet by mouth Every Night.       rOPINIRole 2 MG tablet  Commonly known as: REQUIP      Take 1 tablet by mouth Every Night.                 Where to Get Your Medications        These medications were sent to [facility] [place], [facility] [place], [place] - [address] [facility] - [phone] [facility] [phone] [address] [place] [address]      Phone: [phone]   HYDROcodone-acetaminophen 5-325 MG per tablet       Information about where to get these medications is not yet available    Ask your nurse or doctor about these medications  apixaban 2.5 MG tablet tablet  polyethylene glycol 17 g packet  sennosides-docusate 8.6-50 MG per tablet        No future appointments.  Additional Instructions for the Follow-ups that You Need to Schedule       Discharge Follow-up with Specialty: Physician at rehab this week, Orthopedics as they have directed   As directed      Specialty: Physician at rehab this week, Orthopedics as they have directed               Contact information for follow-up providers       Provider, No Known .    Contact information:  [facility] [address]                       Contact information for after-discharge care       Destination       [facility] .    Service: Skilled Nursing  Contact information:  [place] [address] [phone]                                   TEST   RESULTS PENDING AT DISCHARGE         Gucci Stafford MD  [facility]  10/08/24  08:11 EDT      Time: greater than 32 minutes on discharge  It was a pleasure taking care of this patient while in the hospital.

## 2024-10-08 NOTE — PLAN OF CARE
Goal Outcome Evaluation:      Patient is POD #2 for a left femur intramedullary nailing and rodding.  Patient is alert x 2-3.  Vitals are stable. Dressing is dry and intact. Izaguirre catheter to bedside drainage.  WBA on LLE.  Accuchecks taken AC/HS.  Norco, and morphine were ordered for pain management.  Patient is discharging this am to [facility].  EMS is scheduled for 11:00 am.

## 2024-10-08 NOTE — CASE MANAGEMENT/SOCIAL WORK
Continued Stay Note  [facility]     Patient Name: Deonte Harrell  MRN: [id number]  Today's Date: 10/8/2024    Admit Date: 10/4/2024    Plan: return to [facility]- [facility] arranged for 10/8 @ 1100   Discharge Plan       Row Name 10/08/24 1042       Plan    Plan Comments DC orders in [facility]. Spoke with Anita/Keyana who confirms [facility] can accept today. Wife updated by phone and she is agreeable. Transfer packet updated and dc summary faxed. [facility] arranged for 1100.                   Discharge Codes    No documentation.                 Expected Discharge Date and Time       Expected Discharge Date Expected Discharge Time    Oct 8, 2024               Jammie Scott RN

## 2024-10-08 NOTE — CASE MANAGEMENT/SOCIAL WORK
Case Management Discharge Note      Final Note: Return to LTC facility         Selected Continued Care - Discharged on 10/8/2024 Admission date: 10/4/2024 - Discharge disposition: Skilled Nursing Facility (DC - External)      Destination Coordination complete.      Service Provider Selected Services Address Phone Fax Patient Preferred    [facility] [address] [place] [id number] [phone] --              Durable Medical Equipment    No services have been selected for the patient.                Dialysis/Infusion    No services have been selected for the patient.                Home Medical Care    No services have been selected for the patient.                Therapy    No services have been selected for the patient.                Community Resources    No services have been selected for the patient.                Community & DME    No services have been selected for the patient.                         Final Discharge Disposition Code: 04 - intermediate care facility

## 2024-10-08 NOTE — PLAN OF CARE
Goal Outcome Evaluation:  Plan of Care Reviewed With: patient        Progress: no change  Outcome Evaluation: Patient remains stable. AOx2. Voiding per Izaguirre. Mod to max assist x2. No complaints of pain this shift. Meds crushed in applesauce. Plans to go back to [place].

## 2024-10-08 NOTE — PLAN OF CARE
Goal Outcome Evaluation:POD1 Left hip IM nailing and rodding, VSS, afebrile, pain controlled with po pain medication, accuchecks with s/s insulin, F/C for urinary retention, Lisinopril and metoprolol held today for soft BP and HR. Anticipate DC back to [place] tomorrow.

## (undated) DEVICE — PENCL SMOKE/EVAC MEGADYNE TELESCP 10FT

## (undated) DEVICE — DRSNG SURESITE WNDW 4X4.5

## (undated) DEVICE — LOCKING SCALPEL: Brand: T2 ALPHA

## (undated) DEVICE — APPL CHLORAPREP HI/LITE 26ML ORNG

## (undated) DEVICE — ADHS SKIN PREMIERPRO EXOFIN TOPICAL HI/VISC .5ML

## (undated) DEVICE — DRAPE,REIN 53X77,STERILE: Brand: MEDLINE

## (undated) DEVICE — ANTIBACTERIAL UNDYED BRAIDED (POLYGLACTIN 910), SYNTHETIC ABSORBABLE SUTURE: Brand: COATED VICRYL

## (undated) DEVICE — VIOLET BRAIDED (POLYGLACTIN 910), SYNTHETIC ABSORBABLE SUTURE: Brand: COATED VICRYL

## (undated) DEVICE — PK HIP PINNING 40

## (undated) DEVICE — NEEDLE, QUINCKE, 20GX3.5": Brand: MEDLINE

## (undated) DEVICE — GLV SURG SENSICARE PI LF PF 8 GRN STRL

## (undated) DEVICE — CONTAINER,SPECIMEN,OR STERILE,4OZ: Brand: MEDLINE

## (undated) DEVICE — PAD,NON-ADHERENT,3X8,STERILE,LF,1/PK: Brand: MEDLINE

## (undated) DEVICE — SYR LUERLOK 20CC BX/50

## (undated) DEVICE — SUT MNCRYL 4/0 PS2 27IN UD MCP426H

## (undated) DEVICE — SOL ISO/ALC 70PCT 4OZ

## (undated) DEVICE — DRSNG WND GZ PAD BORDERED 4X8IN STRL

## (undated) DEVICE — LAG SCREW REAMER: Brand: GAMMA

## (undated) DEVICE — DRSNG WND BORDR/ADHS NONADHR/GZ LF 4X4IN STRL

## (undated) DEVICE — BNDG ELAS CO-FLEX SLF ADHR 6IN 5YD LF STRL

## (undated) DEVICE — GLV SURG SENSICARE PI MIC PF SZ8 LF STRL

## (undated) DEVICE — SUT MNCRYL 3/0 PS2 18IN MCP497G

## (undated) DEVICE — LOCKING DRILL

## (undated) DEVICE — MAT FLR ABSORBENT LG 4FT 10 2.5FT